# Patient Record
Sex: MALE | Race: WHITE | Employment: UNEMPLOYED | URBAN - METROPOLITAN AREA
[De-identification: names, ages, dates, MRNs, and addresses within clinical notes are randomized per-mention and may not be internally consistent; named-entity substitution may affect disease eponyms.]

---

## 2018-03-01 ENCOUNTER — OFFICE VISIT (OUTPATIENT)
Dept: CARDIOLOGY | Age: 53
End: 2018-03-01
Payer: MEDICARE

## 2018-03-01 ENCOUNTER — HOSPITAL ENCOUNTER (OUTPATIENT)
Age: 53
Discharge: HOME | End: 2018-03-01
Payer: MEDICARE

## 2018-03-01 VITALS
OXYGEN SATURATION: 96 % | HEIGHT: 68 IN | HEART RATE: 81 BPM | DIASTOLIC BLOOD PRESSURE: 91 MMHG | BODY MASS INDEX: 34.98 KG/M2 | SYSTOLIC BLOOD PRESSURE: 143 MMHG | WEIGHT: 230.8 LBS

## 2018-03-01 DIAGNOSIS — Z01.818 PREOP TESTING: ICD-10-CM

## 2018-03-01 DIAGNOSIS — I35.0 MODERATE TO SEVERE AORTIC STENOSIS: Primary | ICD-10-CM

## 2018-03-01 DIAGNOSIS — R06.00 DYSPNEA, UNSPECIFIED TYPE: ICD-10-CM

## 2018-03-01 DIAGNOSIS — R07.9 CHEST PAIN, UNSPECIFIED TYPE: ICD-10-CM

## 2018-03-01 LAB
ANION GAP SERPL CALCULATED.3IONS-SCNC: 13 MMOL/L (ref 9–17)
BUN BLDV-MCNC: 17 MG/DL (ref 6–20)
BUN/CREAT BLD: 20 (ref 9–20)
CALCIUM SERPL-MCNC: 9.2 MG/DL (ref 8.6–10.4)
CHLORIDE BLD-SCNC: 97 MMOL/L (ref 98–107)
CO2: 29 MMOL/L (ref 20–31)
CREAT SERPL-MCNC: 0.83 MG/DL (ref 0.7–1.2)
GFR AFRICAN AMERICAN: >60 ML/MIN
GFR NON-AFRICAN AMERICAN: >60 ML/MIN
GFR SERPL CREATININE-BSD FRML MDRD: ABNORMAL ML/MIN/{1.73_M2}
GFR SERPL CREATININE-BSD FRML MDRD: ABNORMAL ML/MIN/{1.73_M2}
GLUCOSE BLD-MCNC: 102 MG/DL (ref 70–99)
HCT VFR BLD CALC: 46.3 % (ref 40.7–50.3)
HEMOGLOBIN: 14.7 G/DL (ref 13–17)
MCH RBC QN AUTO: 30.4 PG (ref 25.2–33.5)
MCHC RBC AUTO-ENTMCNC: 31.7 G/DL (ref 28.4–34.8)
MCV RBC AUTO: 95.9 FL (ref 82.6–102.9)
NRBC AUTOMATED: 0 PER 100 WBC
PDW BLD-RTO: 13.9 % (ref 11.8–14.4)
PLATELET # BLD: 269 K/UL (ref 138–453)
PMV BLD AUTO: 11.2 FL (ref 8.1–13.5)
POTASSIUM SERPL-SCNC: 4.3 MMOL/L (ref 3.7–5.3)
RBC # BLD: 4.83 M/UL (ref 4.21–5.77)
SODIUM BLD-SCNC: 139 MMOL/L (ref 135–144)
WBC # BLD: 9.2 K/UL (ref 3.5–11.3)

## 2018-03-01 PROCEDURE — 80048 BASIC METABOLIC PNL TOTAL CA: CPT

## 2018-03-01 PROCEDURE — 99245 OFF/OP CONSLTJ NEW/EST HI 55: CPT | Performed by: FAMILY MEDICINE

## 2018-03-01 PROCEDURE — 85027 COMPLETE CBC AUTOMATED: CPT

## 2018-03-01 PROCEDURE — 93000 ELECTROCARDIOGRAM COMPLETE: CPT | Performed by: FAMILY MEDICINE

## 2018-03-01 PROCEDURE — 36415 COLL VENOUS BLD VENIPUNCTURE: CPT

## 2018-03-01 RX ORDER — NITROGLYCERIN 0.4 MG/1
0.4 TABLET SUBLINGUAL EVERY 5 MIN PRN
COMMUNITY
End: 2018-03-01

## 2018-03-01 RX ORDER — MONTELUKAST SODIUM 10 MG/1
10 TABLET ORAL DAILY
Refills: 0 | COMMUNITY
Start: 2018-01-29

## 2018-03-01 RX ORDER — TIZANIDINE 4 MG/1
4 TABLET ORAL 3 TIMES DAILY
Refills: 0 | COMMUNITY
Start: 2018-01-26

## 2018-03-01 RX ORDER — MELOXICAM 15 MG/1
15 TABLET ORAL DAILY
Refills: 0 | Status: ON HOLD | COMMUNITY
Start: 2018-01-26 | End: 2018-03-20 | Stop reason: HOSPADM

## 2018-03-01 RX ORDER — FUROSEMIDE 20 MG/1
20 TABLET ORAL DAILY
Refills: 0 | COMMUNITY
Start: 2018-02-12 | End: 2018-03-02 | Stop reason: SDUPTHER

## 2018-03-01 RX ORDER — ALBUTEROL SULFATE 2.5 MG/3ML
SOLUTION RESPIRATORY (INHALATION)
Refills: 0 | COMMUNITY
Start: 2017-12-06

## 2018-03-01 RX ORDER — BUDESONIDE AND FORMOTEROL FUMARATE DIHYDRATE 160; 4.5 UG/1; UG/1
AEROSOL RESPIRATORY (INHALATION)
Refills: 0 | COMMUNITY
Start: 2018-02-14

## 2018-03-01 RX ORDER — NITROGLYCERIN 0.4 MG/1
0.4 TABLET SUBLINGUAL EVERY 5 MIN PRN
Qty: 25 TABLET | Refills: 3 | Status: ON HOLD | OUTPATIENT
Start: 2018-03-01 | End: 2018-03-20 | Stop reason: HOSPADM

## 2018-03-01 RX ORDER — OMEPRAZOLE 20 MG/1
20 CAPSULE, DELAYED RELEASE ORAL DAILY
Refills: 0 | Status: ON HOLD | COMMUNITY
Start: 2018-01-29 | End: 2018-03-20 | Stop reason: HOSPADM

## 2018-03-01 RX ORDER — AMITRIPTYLINE HYDROCHLORIDE 50 MG/1
100 TABLET, FILM COATED ORAL DAILY
Refills: 0 | COMMUNITY
Start: 2018-01-26
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
## 2018-03-02 RX ORDER — FUROSEMIDE 20 MG/1
20 TABLET ORAL DAILY
Qty: 90 TABLET | Refills: 3 | Status: SHIPPED | OUTPATIENT
Start: 2018-03-02 | End: 2018-03-05 | Stop reason: ALTCHOICE

## 2018-03-05 ENCOUNTER — TELEPHONE (OUTPATIENT)
Dept: CARDIOLOGY | Age: 53
End: 2018-03-05

## 2018-03-05 RX ORDER — FUROSEMIDE 20 MG/1
20 TABLET ORAL DAILY
Qty: 90 TABLET | Refills: 3 | Status: ON HOLD | OUTPATIENT
Start: 2018-03-05 | End: 2018-03-20 | Stop reason: HOSPADM

## 2018-03-05 NOTE — TELEPHONE ENCOUNTER
Patient called upset that he has not been able to get his Lasix. For some reason the Rx was sent to pharmacy as regular LASIX JUANCARLOS which patient has never had and requires a prior auth so he has not been able to get. He reports he has always taken the generic form Furosemide. It looks like Dr Jesus Moss did approve for the Furosemide 20mg daily as in his office note.   Called pharmacy [facility] and called in Rx for the generic Furosemide for patient so he could  the medication to take as he is all out

## 2018-03-15 ENCOUNTER — APPOINTMENT (OUTPATIENT)
Dept: GENERAL RADIOLOGY | Age: 53
DRG: 160 | End: 2018-03-15
Attending: THORACIC SURGERY (CARDIOTHORACIC VASCULAR SURGERY)
Payer: MEDICARE

## 2018-03-15 ENCOUNTER — ANESTHESIA EVENT (OUTPATIENT)
Dept: OPERATING ROOM | Age: 53
DRG: 160 | End: 2018-03-15
Payer: MEDICARE

## 2018-03-15 ENCOUNTER — HOSPITAL ENCOUNTER (INPATIENT)
Age: 53
LOS: 5 days | Discharge: HOME HEALTH | DRG: 160 | End: 2018-03-20
Attending: THORACIC SURGERY (CARDIOTHORACIC VASCULAR SURGERY) | Admitting: THORACIC SURGERY (CARDIOTHORACIC VASCULAR SURGERY)
Payer: MEDICARE

## 2018-03-15 ENCOUNTER — HOSPITAL ENCOUNTER (OUTPATIENT)
Dept: NON INVASIVE DIAGNOSTICS | Age: 53
Discharge: HOME | End: 2018-03-15
Payer: MEDICARE

## 2018-03-15 ENCOUNTER — HOSPITAL ENCOUNTER (OUTPATIENT)
Dept: CARDIAC CATH/INVASIVE PROCEDURES | Age: 53
Discharge: HOME | End: 2018-03-15
Payer: MEDICARE

## 2018-03-15 VITALS
RESPIRATION RATE: 16 BRPM | SYSTOLIC BLOOD PRESSURE: 153 MMHG | TEMPERATURE: 98.6 F | BODY MASS INDEX: 34.86 KG/M2 | OXYGEN SATURATION: 96 % | HEART RATE: 73 BPM | WEIGHT: 230 LBS | HEIGHT: 68 IN | DIASTOLIC BLOOD PRESSURE: 78 MMHG

## 2018-03-15 DIAGNOSIS — R06.00 DYSPNEA, UNSPECIFIED TYPE: ICD-10-CM

## 2018-03-15 DIAGNOSIS — Z95.1 S/P CABG X 2: Primary | ICD-10-CM

## 2018-03-15 DIAGNOSIS — R07.9 CHEST PAIN, UNSPECIFIED TYPE: ICD-10-CM

## 2018-03-15 PROBLEM — I25.10 CAD, MULTIPLE VESSEL: Status: ACTIVE | Noted: 2018-03-15

## 2018-03-15 LAB
ABSOLUTE EOS #: 0.21 K/UL (ref 0–0.44)
ABSOLUTE IMMATURE GRANULOCYTE: 0.03 K/UL (ref 0–0.3)
ABSOLUTE LYMPH #: 2.84 K/UL (ref 1.1–3.7)
ABSOLUTE MONO #: 1.29 K/UL (ref 0.1–1.2)
BASOPHILS # BLD: 1 % (ref 0–2)
BASOPHILS ABSOLUTE: 0.07 K/UL (ref 0–0.2)
BILIRUBIN URINE: NEGATIVE
COLOR: YELLOW
COMMENT UA: NORMAL
CULTURE: NORMAL
DIFFERENTIAL TYPE: ABNORMAL
EOSINOPHILS RELATIVE PERCENT: 2 % (ref 1–4)
GLUCOSE BLD-MCNC: 174 MG/DL (ref 75–110)
GLUCOSE URINE: NEGATIVE
HCT VFR BLD CALC: 42 % (ref 40.7–50.3)
HEMOGLOBIN: 13.4 G/DL (ref 13–17)
IMMATURE GRANULOCYTES: 0 %
KETONES, URINE: NEGATIVE
LEUKOCYTE ESTERASE, URINE: NEGATIVE
LV EF: 65 %
LVEF MODALITY: NORMAL
LYMPHOCYTES # BLD: 26 % (ref 24–43)
Lab: NORMAL
MCH RBC QN AUTO: 30.3 PG (ref 25.2–33.5)
MCHC RBC AUTO-ENTMCNC: 31.9 G/DL (ref 28.4–34.8)
MCV RBC AUTO: 95 FL (ref 82.6–102.9)
MONOCYTES # BLD: 12 % (ref 3–12)
MRSA, DNA, NASAL: NORMAL
NITRITE, URINE: NEGATIVE
NRBC AUTOMATED: 0 PER 100 WBC
PARTIAL THROMBOPLASTIN TIME: 31.4 SEC (ref 20.5–30.5)
PDW BLD-RTO: 13.6 % (ref 11.8–14.4)
PH UA: 7 (ref 5–8)
PLATELET # BLD: 257 K/UL (ref 138–453)
PLATELET ESTIMATE: ABNORMAL
PMV BLD AUTO: 11.1 FL (ref 8.1–13.5)
PROTEIN UA: NEGATIVE
RBC # BLD: 4.42 M/UL (ref 4.21–5.77)
RBC # BLD: ABNORMAL 10*6/UL
SEG NEUTROPHILS: 59 % (ref 36–65)
SEGMENTED NEUTROPHILS ABSOLUTE COUNT: 6.64 K/UL (ref 1.5–8.1)
SPECIFIC GRAVITY UA: 1.01 (ref 1–1.03)
SPECIMEN DESCRIPTION: NORMAL
SPECIMEN DESCRIPTION: NORMAL
STATUS: NORMAL
TURBIDITY: CLEAR
URINE HGB: NEGATIVE
UROBILINOGEN, URINE: NORMAL
WBC # BLD: 11.1 K/UL (ref 3.5–11.3)
WBC # BLD: ABNORMAL 10*3/UL

## 2018-03-15 PROCEDURE — 2580000003 HC RX 258: Performed by: THORACIC SURGERY (CARDIOTHORACIC VASCULAR SURGERY)

## 2018-03-15 PROCEDURE — 85730 THROMBOPLASTIN TIME PARTIAL: CPT

## 2018-03-15 PROCEDURE — 93005 ELECTROCARDIOGRAM TRACING: CPT

## 2018-03-15 PROCEDURE — 2060000000 HC ICU INTERMEDIATE R&B

## 2018-03-15 PROCEDURE — 71045 X-RAY EXAM CHEST 1 VIEW: CPT

## 2018-03-15 PROCEDURE — 86920 COMPATIBILITY TEST SPIN: CPT

## 2018-03-15 PROCEDURE — 93880 EXTRACRANIAL BILAT STUDY: CPT

## 2018-03-15 PROCEDURE — 87081 CULTURE SCREEN ONLY: CPT

## 2018-03-15 PROCEDURE — C1725 CATH, TRANSLUMIN NON-LASER: HCPCS

## 2018-03-15 PROCEDURE — 93312 ECHO TRANSESOPHAGEAL: CPT | Performed by: FAMILY MEDICINE

## 2018-03-15 PROCEDURE — C1894 INTRO/SHEATH, NON-LASER: HCPCS

## 2018-03-15 PROCEDURE — 2500000003 HC RX 250 WO HCPCS

## 2018-03-15 PROCEDURE — 85025 COMPLETE CBC W/AUTO DIFF WBC: CPT

## 2018-03-15 PROCEDURE — 86901 BLOOD TYPING SEROLOGIC RH(D): CPT

## 2018-03-15 PROCEDURE — 86850 RBC ANTIBODY SCREEN: CPT

## 2018-03-15 PROCEDURE — C1887 CATHETER, GUIDING: HCPCS

## 2018-03-15 PROCEDURE — 94729 DIFFUSING CAPACITY: CPT

## 2018-03-15 PROCEDURE — 6360000002 HC RX W HCPCS

## 2018-03-15 PROCEDURE — 94640 AIRWAY INHALATION TREATMENT: CPT

## 2018-03-15 PROCEDURE — 81003 URINALYSIS AUTO W/O SCOPE: CPT

## 2018-03-15 PROCEDURE — 2580000003 HC RX 258: Performed by: FAMILY MEDICINE

## 2018-03-15 PROCEDURE — 93325 DOPPLER ECHO COLOR FLOW MAPG: CPT | Performed by: FAMILY MEDICINE

## 2018-03-15 PROCEDURE — 93312 ECHO TRANSESOPHAGEAL: CPT

## 2018-03-15 PROCEDURE — 36415 COLL VENOUS BLD VENIPUNCTURE: CPT

## 2018-03-15 PROCEDURE — 87641 MR-STAPH DNA AMP PROBE: CPT

## 2018-03-15 PROCEDURE — 93458 L HRT ARTERY/VENTRICLE ANGIO: CPT | Performed by: FAMILY MEDICINE

## 2018-03-15 PROCEDURE — 6360000002 HC RX W HCPCS: Performed by: FAMILY MEDICINE

## 2018-03-15 PROCEDURE — 86900 BLOOD TYPING SEROLOGIC ABO: CPT

## 2018-03-15 PROCEDURE — 82947 ASSAY GLUCOSE BLOOD QUANT: CPT

## 2018-03-15 PROCEDURE — 6370000000 HC RX 637 (ALT 250 FOR IP): Performed by: FAMILY MEDICINE

## 2018-03-15 PROCEDURE — 94726 PLETHYSMOGRAPHY LUNG VOLUMES: CPT

## 2018-03-15 PROCEDURE — 6370000000 HC RX 637 (ALT 250 FOR IP): Performed by: THORACIC SURGERY (CARDIOTHORACIC VASCULAR SURGERY)

## 2018-03-15 PROCEDURE — 94060 EVALUATION OF WHEEZING: CPT

## 2018-03-15 PROCEDURE — 94664 DEMO&/EVAL PT USE INHALER: CPT

## 2018-03-15 PROCEDURE — 6370000000 HC RX 637 (ALT 250 FOR IP)

## 2018-03-15 PROCEDURE — C1769 GUIDE WIRE: HCPCS

## 2018-03-15 RX ORDER — ONDANSETRON 2 MG/ML
4 INJECTION INTRAMUSCULAR; INTRAVENOUS EVERY 6 HOURS PRN
Status: DISCONTINUED | OUTPATIENT
Start: 2018-03-15 | End: 2018-03-16

## 2018-03-15 RX ORDER — ASPIRIN 81 MG/1
81 TABLET ORAL DAILY
Status: DISCONTINUED | OUTPATIENT
Start: 2018-03-15 | End: 2018-03-16

## 2018-03-15 RX ORDER — MIDAZOLAM HYDROCHLORIDE 1 MG/ML
INJECTION INTRAMUSCULAR; INTRAVENOUS
Status: COMPLETED | OUTPATIENT
Start: 2018-03-15 | End: 2018-03-15

## 2018-03-15 RX ORDER — SODIUM CHLORIDE 9 MG/ML
INJECTION, SOLUTION INTRAVENOUS CONTINUOUS
Status: DISCONTINUED | OUTPATIENT
Start: 2018-03-15 | End: 2018-03-16

## 2018-03-15 RX ORDER — AMIODARONE HYDROCHLORIDE 200 MG/1
200 TABLET ORAL 3 TIMES DAILY
Status: DISCONTINUED | OUTPATIENT
Start: 2018-03-15 | End: 2018-03-16

## 2018-03-15 RX ORDER — SODIUM CHLORIDE 0.9 % (FLUSH) 0.9 %
10 SYRINGE (ML) INJECTION EVERY 12 HOURS SCHEDULED
Status: DISCONTINUED | OUTPATIENT
Start: 2018-03-15 | End: 2018-03-15

## 2018-03-15 RX ORDER — DIPHENHYDRAMINE HCL 50 MG
50 CAPSULE ORAL ONCE
Status: DISCONTINUED | OUTPATIENT
Start: 2018-03-15 | End: 2018-03-16 | Stop reason: HOSPADM

## 2018-03-15 RX ORDER — ACETAMINOPHEN 325 MG/1
650 TABLET ORAL EVERY 4 HOURS PRN
Status: DISCONTINUED | OUTPATIENT
Start: 2018-03-15 | End: 2018-03-16 | Stop reason: HOSPADM

## 2018-03-15 RX ORDER — SODIUM CHLORIDE 0.9 % (FLUSH) 0.9 %
10 SYRINGE (ML) INJECTION EVERY 12 HOURS SCHEDULED
Status: DISCONTINUED | OUTPATIENT
Start: 2018-03-15 | End: 2018-03-16 | Stop reason: HOSPADM

## 2018-03-15 RX ORDER — FENTANYL CITRATE 50 UG/ML
INJECTION, SOLUTION INTRAMUSCULAR; INTRAVENOUS
Status: COMPLETED | OUTPATIENT
Start: 2018-03-15 | End: 2018-03-15

## 2018-03-15 RX ORDER — ZOLPIDEM TARTRATE 5 MG/1
5 TABLET ORAL NIGHTLY PRN
Status: DISCONTINUED | OUTPATIENT
Start: 2018-03-15 | End: 2018-03-16

## 2018-03-15 RX ORDER — SODIUM CHLORIDE 0.9 % (FLUSH) 0.9 %
10 SYRINGE (ML) INJECTION PRN
Status: DISCONTINUED | OUTPATIENT
Start: 2018-03-15 | End: 2018-03-16

## 2018-03-15 RX ORDER — NITROGLYCERIN 0.4 MG/1
0.4 TABLET SUBLINGUAL EVERY 5 MIN PRN
Status: DISCONTINUED | OUTPATIENT
Start: 2018-03-15 | End: 2018-03-16 | Stop reason: HOSPADM

## 2018-03-15 RX ORDER — SODIUM CHLORIDE 0.9 % (FLUSH) 0.9 %
10 SYRINGE (ML) INJECTION PRN
Status: DISCONTINUED | OUTPATIENT
Start: 2018-03-15 | End: 2018-03-15

## 2018-03-15 RX ORDER — CHLORHEXIDINE GLUCONATE 0.12 MG/ML
15 RINSE ORAL ONCE
Status: COMPLETED | OUTPATIENT
Start: 2018-03-15 | End: 2018-03-16

## 2018-03-15 RX ORDER — SODIUM CHLORIDE 0.9 % (FLUSH) 0.9 %
10 SYRINGE (ML) INJECTION PRN
Status: DISCONTINUED | OUTPATIENT
Start: 2018-03-15 | End: 2018-03-16 | Stop reason: HOSPADM

## 2018-03-15 RX ORDER — ALPRAZOLAM 0.5 MG/1
0.5 TABLET ORAL 3 TIMES DAILY PRN
Status: DISCONTINUED | OUTPATIENT
Start: 2018-03-15 | End: 2018-03-16

## 2018-03-15 RX ORDER — ACETAMINOPHEN 325 MG/1
650 TABLET ORAL EVERY 4 HOURS PRN
Status: DISCONTINUED | OUTPATIENT
Start: 2018-03-15 | End: 2018-03-15

## 2018-03-15 RX ORDER — SODIUM CHLORIDE 9 MG/ML
INJECTION, SOLUTION INTRAVENOUS CONTINUOUS
Status: DISCONTINUED | OUTPATIENT
Start: 2018-03-15 | End: 2018-03-16 | Stop reason: HOSPADM

## 2018-03-15 RX ORDER — SODIUM CHLORIDE 0.9 % (FLUSH) 0.9 %
10 SYRINGE (ML) INJECTION EVERY 12 HOURS SCHEDULED
Status: DISCONTINUED | OUTPATIENT
Start: 2018-03-15 | End: 2018-03-16

## 2018-03-15 RX ADMIN — MIDAZOLAM HYDROCHLORIDE 1 MG: 2 INJECTION, SOLUTION INTRAMUSCULAR; INTRAVENOUS at 09:38

## 2018-03-15 RX ADMIN — ASPIRIN 81 MG: 81 TABLET, COATED ORAL at 18:01

## 2018-03-15 RX ADMIN — MIDAZOLAM HYDROCHLORIDE 1 MG: 2 INJECTION, SOLUTION INTRAMUSCULAR; INTRAVENOUS at 09:35

## 2018-03-15 RX ADMIN — BENZOCAINE 1 EACH: 200 SPRAY DENTAL; ORAL; PERIODONTAL at 09:21

## 2018-03-15 RX ADMIN — MIDAZOLAM HYDROCHLORIDE 1 MG: 2 INJECTION, SOLUTION INTRAMUSCULAR; INTRAVENOUS at 09:28

## 2018-03-15 RX ADMIN — MIDAZOLAM HYDROCHLORIDE 2 MG: 2 INJECTION, SOLUTION INTRAMUSCULAR; INTRAVENOUS at 09:25

## 2018-03-15 RX ADMIN — BENZOCAINE 1 EACH: 200 SPRAY DENTAL; ORAL; PERIODONTAL at 09:22

## 2018-03-15 RX ADMIN — AMIODARONE HYDROCHLORIDE 200 MG: 200 TABLET ORAL at 18:00

## 2018-03-15 RX ADMIN — MIDAZOLAM HYDROCHLORIDE 1 MG: 2 INJECTION, SOLUTION INTRAMUSCULAR; INTRAVENOUS at 09:32

## 2018-03-15 RX ADMIN — SODIUM CHLORIDE: 9 INJECTION, SOLUTION INTRAVENOUS at 08:40

## 2018-03-15 RX ADMIN — MIDAZOLAM HYDROCHLORIDE 2 MG: 2 INJECTION, SOLUTION INTRAMUSCULAR; INTRAVENOUS at 09:28

## 2018-03-15 RX ADMIN — MIDAZOLAM HYDROCHLORIDE 1 MG: 2 INJECTION, SOLUTION INTRAMUSCULAR; INTRAVENOUS at 09:24

## 2018-03-15 RX ADMIN — Medication 10 ML: at 19:46

## 2018-03-15 RX ADMIN — AMIODARONE HYDROCHLORIDE 200 MG: 200 TABLET ORAL at 19:46

## 2018-03-15 RX ADMIN — METOPROLOL TARTRATE 25 MG: 25 TABLET ORAL at 18:04

## 2018-03-15 RX ADMIN — FENTANYL CITRATE 25 MCG: 50 INJECTION, SOLUTION INTRAMUSCULAR; INTRAVENOUS at 09:24

## 2018-03-15 RX ADMIN — MIDAZOLAM HYDROCHLORIDE 1 MG: 2 INJECTION, SOLUTION INTRAMUSCULAR; INTRAVENOUS at 09:31

## 2018-03-15 RX ADMIN — SODIUM CHLORIDE: 9 INJECTION, SOLUTION INTRAVENOUS at 18:00

## 2018-03-15 ASSESSMENT — PAIN DESCRIPTION - PROGRESSION: CLINICAL_PROGRESSION: NOT CHANGED

## 2018-03-15 ASSESSMENT — PAIN SCALES - GENERAL: PAINLEVEL_OUTOF10: 8

## 2018-03-15 ASSESSMENT — PAIN DESCRIPTION - FREQUENCY: FREQUENCY: CONTINUOUS

## 2018-03-15 ASSESSMENT — PAIN DESCRIPTION - ONSET: ONSET: ON-GOING

## 2018-03-15 ASSESSMENT — PAIN DESCRIPTION - PAIN TYPE: TYPE: ACUTE PAIN

## 2018-03-15 ASSESSMENT — PAIN DESCRIPTION - ORIENTATION: ORIENTATION: LEFT

## 2018-03-15 ASSESSMENT — PAIN DESCRIPTION - LOCATION: LOCATION: ARM

## 2018-03-15 ASSESSMENT — PAIN DESCRIPTION - DESCRIPTORS: DESCRIPTORS: SHARP

## 2018-03-15 NOTE — PROGRESS NOTES
Voids per urinal.  Transferred from cart to chair. Respirations easy. TR band remains in place. Vital signs stable.

## 2018-03-16 ENCOUNTER — ANESTHESIA (OUTPATIENT)
Dept: OPERATING ROOM | Age: 53
DRG: 160 | End: 2018-03-16
Payer: MEDICARE

## 2018-03-16 ENCOUNTER — APPOINTMENT (OUTPATIENT)
Dept: CT IMAGING | Age: 53
DRG: 160 | End: 2018-03-16
Attending: THORACIC SURGERY (CARDIOTHORACIC VASCULAR SURGERY)
Payer: MEDICARE

## 2018-03-16 ENCOUNTER — APPOINTMENT (OUTPATIENT)
Dept: GENERAL RADIOLOGY | Age: 53
DRG: 160 | End: 2018-03-16
Attending: THORACIC SURGERY (CARDIOTHORACIC VASCULAR SURGERY)
Payer: MEDICARE

## 2018-03-16 VITALS
OXYGEN SATURATION: 100 % | RESPIRATION RATE: 16 BRPM | SYSTOLIC BLOOD PRESSURE: 158 MMHG | TEMPERATURE: 98.2 F | DIASTOLIC BLOOD PRESSURE: 82 MMHG

## 2018-03-16 LAB
ABSOLUTE EOS #: 0.38 K/UL (ref 0–0.44)
ABSOLUTE IMMATURE GRANULOCYTE: 0.03 K/UL (ref 0–0.3)
ABSOLUTE LYMPH #: 2.28 K/UL (ref 1.1–3.7)
ABSOLUTE MONO #: 1.27 K/UL (ref 0.1–1.2)
ALLEN TEST: ABNORMAL
ANION GAP SERPL CALCULATED.3IONS-SCNC: 11 MMOL/L (ref 9–17)
ANION GAP SERPL CALCULATED.3IONS-SCNC: 13 MMOL/L (ref 9–17)
ANION GAP: 12 MMOL/L (ref 7–16)
ANION GAP: 9 MMOL/L (ref 7–16)
BASOPHILS # BLD: 1 % (ref 0–2)
BASOPHILS ABSOLUTE: 0.07 K/UL (ref 0–0.2)
BILIRUBIN URINE: NEGATIVE
BUN BLDV-MCNC: 11 MG/DL (ref 6–20)
BUN BLDV-MCNC: 12 MG/DL (ref 6–20)
BUN/CREAT BLD: ABNORMAL (ref 9–20)
BUN/CREAT BLD: ABNORMAL (ref 9–20)
CALCIUM SERPL-MCNC: 7 MG/DL (ref 8.6–10.4)
CALCIUM SERPL-MCNC: 8.9 MG/DL (ref 8.6–10.4)
CHLORIDE BLD-SCNC: 103 MMOL/L (ref 98–107)
CHLORIDE BLD-SCNC: 107 MMOL/L (ref 98–107)
CO2: 22 MMOL/L (ref 20–31)
CO2: 24 MMOL/L (ref 20–31)
COLOR: YELLOW
COMMENT UA: NORMAL
CREAT SERPL-MCNC: 0.57 MG/DL (ref 0.7–1.2)
CREAT SERPL-MCNC: 0.58 MG/DL (ref 0.7–1.2)
DIFFERENTIAL TYPE: ABNORMAL
EKG ATRIAL RATE: 76 BPM
EKG P AXIS: 50 DEGREES
EKG P-R INTERVAL: 170 MS
EKG Q-T INTERVAL: 462 MS
EKG QRS DURATION: 160 MS
EKG QTC CALCULATION (BAZETT): 519 MS
EKG R AXIS: -63 DEGREES
EKG T AXIS: 77 DEGREES
EKG VENTRICULAR RATE: 76 BPM
EOSINOPHILS RELATIVE PERCENT: 4 % (ref 1–4)
FIBRINOGEN: 246 MG/DL (ref 140–420)
FIO2: 60
FIO2: 60
FIO2: ABNORMAL
GFR AFRICAN AMERICAN: >60 ML/MIN
GFR AFRICAN AMERICAN: >60 ML/MIN
GFR NON-AFRICAN AMERICAN: >60 ML/MIN
GFR SERPL CREATININE-BSD FRML MDRD: >60 ML/MIN
GFR SERPL CREATININE-BSD FRML MDRD: ABNORMAL ML/MIN/{1.73_M2}
GFR SERPL CREATININE-BSD FRML MDRD: NORMAL ML/MIN/{1.73_M2}
GLUCOSE BLD-MCNC: 100 MG/DL (ref 70–99)
GLUCOSE BLD-MCNC: 131 MG/DL (ref 70–99)
GLUCOSE BLD-MCNC: 134 MG/DL (ref 74–100)
GLUCOSE BLD-MCNC: 142 MG/DL (ref 74–100)
GLUCOSE BLD-MCNC: 160 MG/DL (ref 74–100)
GLUCOSE BLD-MCNC: 161 MG/DL (ref 74–100)
GLUCOSE BLD-MCNC: 163 MG/DL (ref 74–100)
GLUCOSE BLD-MCNC: 165 MG/DL (ref 75–110)
GLUCOSE BLD-MCNC: 172 MG/DL (ref 74–100)
GLUCOSE BLD-MCNC: 185 MG/DL (ref 74–100)
GLUCOSE BLD-MCNC: 196 MG/DL (ref 74–100)
GLUCOSE URINE: NEGATIVE
HCO3 VENOUS: 25 MMOL/L (ref 22–29)
HCT VFR BLD CALC: 27.6 % (ref 40.7–50.3)
HCT VFR BLD CALC: 34.4 % (ref 40.7–50.3)
HCT VFR BLD CALC: 40.1 % (ref 40.7–50.3)
HEMOGLOBIN: 10.8 G/DL (ref 13–17)
HEMOGLOBIN: 12.4 G/DL (ref 13–17)
HEMOGLOBIN: 8.7 G/DL (ref 13–17)
IMMATURE GRANULOCYTES: 0 %
INR BLD: 0.9
INR BLD: 1.2
KETONES, URINE: NEGATIVE
LEUKOCYTE ESTERASE, URINE: NEGATIVE
LYMPHOCYTES # BLD: 22 % (ref 24–43)
MAGNESIUM: 2.9 MG/DL (ref 1.6–2.6)
MCH RBC QN AUTO: 30.4 PG (ref 25.2–33.5)
MCH RBC QN AUTO: 30.9 PG (ref 25.2–33.5)
MCHC RBC AUTO-ENTMCNC: 30.9 G/DL (ref 28.4–34.8)
MCHC RBC AUTO-ENTMCNC: 31.4 G/DL (ref 28.4–34.8)
MCV RBC AUTO: 98.3 FL (ref 82.6–102.9)
MCV RBC AUTO: 98.3 FL (ref 82.6–102.9)
MODE: ABNORMAL
MONOCYTES # BLD: 12 % (ref 3–12)
NEGATIVE BASE EXCESS, ART: 1 (ref 0–2)
NEGATIVE BASE EXCESS, ART: 2 (ref 0–2)
NEGATIVE BASE EXCESS, ART: ABNORMAL (ref 0–2)
NEGATIVE BASE EXCESS, VEN: 1 (ref 0–2)
NITRITE, URINE: NEGATIVE
NRBC AUTOMATED: 0 PER 100 WBC
NRBC AUTOMATED: 0 PER 100 WBC
O2 DEVICE/FLOW/%: ABNORMAL
O2 SAT, VEN: 100 % (ref 60–85)
PARTIAL THROMBOPLASTIN TIME: 28 SEC (ref 20.5–30.5)
PARTIAL THROMBOPLASTIN TIME: 29.1 SEC (ref 20.5–30.5)
PATIENT TEMP: 38
PATIENT TEMP: ABNORMAL
PCO2, VEN: 49.6 MM HG (ref 41–51)
PDW BLD-RTO: 13.5 % (ref 11.8–14.4)
PDW BLD-RTO: 13.6 % (ref 11.8–14.4)
PH UA: 6.5 (ref 5–8)
PH VENOUS: 7.31 (ref 7.32–7.43)
PLATELET # BLD: 107 K/UL (ref 138–453)
PLATELET # BLD: 147 K/UL (ref 138–453)
PLATELET # BLD: 235 K/UL (ref 138–453)
PLATELET ESTIMATE: ABNORMAL
PMV BLD AUTO: 11.1 FL (ref 8.1–13.5)
PMV BLD AUTO: 11.4 FL (ref 8.1–13.5)
PO2, VEN: 241 MM HG (ref 30–50)
POC ANGLE TEG W HEP: 68.5 DEG (ref 59–74)
POC ANGLE TEG W HEP: 69.9 DEG (ref 59–74)
POC ANGLE TEG: 68.4 DEG (ref 59–74)
POC ANGLE TEG: 71.1 DEG (ref 59–74)
POC CHLORIDE: 108 MMOL/L (ref 98–107)
POC CHLORIDE: 109 MMOL/L (ref 98–107)
POC CREATININE: 0.55 MG/DL (ref 0.51–1.19)
POC EPL TEG W/HEP: 2 % (ref 0–15)
POC EPL TEG W/HEP: NORMAL % (ref 0–15)
POC EPL TEG: 0.4 % (ref 0–15)
POC EPL TEG: NORMAL % (ref 0–15)
POC HCO3: 21.6 MMOL/L (ref 21–28)
POC HCO3: 25 MMOL/L (ref 21–28)
POC HCO3: 26 MMOL/L (ref 21–28)
POC HCO3: 26.5 MMOL/L (ref 21–28)
POC HCO3: 27.3 MMOL/L (ref 21–28)
POC HCO3: 27.7 MMOL/L (ref 21–28)
POC HCO3: 27.8 MMOL/L (ref 21–28)
POC HEMATOCRIT: 25 % (ref 41–53)
POC HEMATOCRIT: 26 % (ref 41–53)
POC HEMATOCRIT: 28 % (ref 41–53)
POC HEMATOCRIT: 30 % (ref 41–53)
POC HEMATOCRIT: 31 % (ref 41–53)
POC HEMATOCRIT: 33 % (ref 41–53)
POC HEMATOCRIT: 35 % (ref 41–53)
POC HEMATOCRIT: 37 % (ref 41–53)
POC HEMOGLOBIN: 10.3 G/DL (ref 13.5–17.5)
POC HEMOGLOBIN: 10.5 G/DL (ref 13.5–17.5)
POC HEMOGLOBIN: 11.2 G/DL (ref 13.5–17.5)
POC HEMOGLOBIN: 11.9 G/DL (ref 13.5–17.5)
POC HEMOGLOBIN: 12.6 G/DL (ref 13.5–17.5)
POC HEMOGLOBIN: 8.6 G/DL (ref 13.5–17.5)
POC HEMOGLOBIN: 8.9 G/DL (ref 13.5–17.5)
POC HEMOGLOBIN: 9.6 G/DL (ref 13.5–17.5)
POC IONIZED CALCIUM: 0.97 MMOL/L (ref 1.15–1.33)
POC IONIZED CALCIUM: 1.07 MMOL/L (ref 1.15–1.33)
POC IONIZED CALCIUM: 1.09 MMOL/L (ref 1.15–1.33)
POC IONIZED CALCIUM: 1.1 MMOL/L (ref 1.15–1.33)
POC IONIZED CALCIUM: 1.12 MMOL/L (ref 1.15–1.33)
POC IONIZED CALCIUM: 1.13 MMOL/L (ref 1.15–1.33)
POC KINETICS TEG W HEP: 1.3 MIN (ref 1–3)
POC KINETICS TEG W HEP: 1.5 MIN (ref 1–3)
POC KINETICS TEG: 1.4 MIN (ref 1–3)
POC KINETICS TEG: 1.5 MIN (ref 1–3)
POC LACTIC ACID: 2.72 MMOL/L (ref 0.56–1.39)
POC LY30(LYSIS) TEG W HEP: 2 % (ref 0–8)
POC LY30(LYSIS) TEG W HEP: NORMAL % (ref 0–8)
POC LY30(LYSIS) TEG: 0.4 % (ref 0–8)
POC LY30(LYSIS) TEG: NORMAL % (ref 0–8)
POC MA(MAX CLOT) TEG: 68.9 MM (ref 55–74)
POC MA(MAX CLOT) TEG: 75.1 MM (ref 55–74)
POC MAX CLOT TEG W HEP: 67.8 MM (ref 55–74)
POC MAX CLOT TEG W HEP: 74.2 MM (ref 55–74)
POC O2 SATURATION: 100 % (ref 94–98)
POC O2 SATURATION: 100 % (ref 94–98)
POC O2 SATURATION: 89 % (ref 94–98)
POC O2 SATURATION: 94 % (ref 94–98)
POC O2 SATURATION: 99 % (ref 94–98)
POC PCO2 TEMP: 34 MM HG
POC PCO2 TEMP: ABNORMAL MM HG
POC PCO2: 32.9 MM HG (ref 35–48)
POC PCO2: 40.1 MM HG (ref 35–48)
POC PCO2: 46.2 MM HG (ref 35–48)
POC PCO2: 46.5 MM HG (ref 35–48)
POC PCO2: 46.9 MM HG (ref 35–48)
POC PCO2: 53.2 MM HG (ref 35–48)
POC PCO2: 54 MM HG (ref 35–48)
POC PH TEMP: 7.41
POC PH TEMP: ABNORMAL
POC PH: 7.29 (ref 7.35–7.45)
POC PH: 7.33 (ref 7.35–7.45)
POC PH: 7.37 (ref 7.35–7.45)
POC PH: 7.38 (ref 7.35–7.45)
POC PH: 7.38 (ref 7.35–7.45)
POC PH: 7.4 (ref 7.35–7.45)
POC PH: 7.43 (ref 7.35–7.45)
POC PO2 TEMP: 74 MM HG
POC PO2 TEMP: ABNORMAL MM HG
POC PO2: 145.8 MM HG (ref 83–108)
POC PO2: 148.6 MM HG (ref 83–108)
POC PO2: 154.1 MM HG (ref 83–108)
POC PO2: 189.1 MM HG (ref 83–108)
POC PO2: 348.2 MM HG (ref 83–108)
POC PO2: 63.9 MM HG (ref 83–108)
POC PO2: 68.9 MM HG (ref 83–108)
POC POTASSIUM: 3.8 MMOL/L (ref 3.5–4.5)
POC POTASSIUM: 3.8 MMOL/L (ref 3.5–4.5)
POC POTASSIUM: 4 MMOL/L (ref 3.5–4.5)
POC POTASSIUM: 4.4 MMOL/L (ref 3.5–4.5)
POC POTASSIUM: 4.7 MMOL/L (ref 3.5–4.5)
POC POTASSIUM: 4.7 MMOL/L (ref 3.5–4.5)
POC POTASSIUM: 5 MMOL/L (ref 3.5–4.5)
POC POTASSIUM: 5.1 MMOL/L (ref 3.5–4.5)
POC REACTION TIME TEG W HEP: 7.5 MIN (ref 4–9)
POC REACTION TIME TEG W HEP: 7.8 MIN (ref 4–9)
POC REACTION TIME TEG: 7.6 MIN (ref 4–9)
POC REACTION TIME TEG: 8.2 MIN (ref 4–9)
POC SODIUM: 142 MMOL/L (ref 138–146)
POC SODIUM: 143 MMOL/L (ref 138–146)
POC SODIUM: 143 MMOL/L (ref 138–146)
POSITIVE BASE EXCESS, ART: 0 (ref 0–3)
POSITIVE BASE EXCESS, ART: 1 (ref 0–3)
POSITIVE BASE EXCESS, ART: 1 (ref 0–3)
POSITIVE BASE EXCESS, ART: 2 (ref 0–3)
POSITIVE BASE EXCESS, ART: 2 (ref 0–3)
POSITIVE BASE EXCESS, ART: ABNORMAL (ref 0–3)
POSITIVE BASE EXCESS, ART: ABNORMAL (ref 0–3)
POSITIVE BASE EXCESS, VEN: ABNORMAL (ref 0–3)
POTASSIUM SERPL-SCNC: 4 MMOL/L (ref 3.7–5.3)
POTASSIUM SERPL-SCNC: 4 MMOL/L (ref 3.7–5.3)
POTASSIUM SERPL-SCNC: 5 MMOL/L (ref 3.7–5.3)
PROTEIN UA: NEGATIVE
PROTHROMBIN TIME: 12.5 SEC (ref 9–12)
PROTHROMBIN TIME: 9.7 SEC (ref 9–12)
RBC # BLD: 3.5 M/UL (ref 4.21–5.77)
RBC # BLD: 4.08 M/UL (ref 4.21–5.77)
RBC # BLD: ABNORMAL 10*6/UL
SAMPLE SITE: ABNORMAL
SEG NEUTROPHILS: 61 % (ref 36–65)
SEGMENTED NEUTROPHILS ABSOLUTE COUNT: 6.31 K/UL (ref 1.5–8.1)
SODIUM BLD-SCNC: 140 MMOL/L (ref 135–144)
SODIUM BLD-SCNC: 140 MMOL/L (ref 135–144)
SPECIFIC GRAVITY UA: 1.01 (ref 1–1.03)
TCO2 (CALC), ART: 23 MMOL/L (ref 22–29)
TCO2 (CALC), ART: 26 MMOL/L (ref 22–29)
TCO2 (CALC), ART: 28 MMOL/L (ref 22–29)
TCO2 (CALC), ART: 28 MMOL/L (ref 22–29)
TCO2 (CALC), ART: 29 MMOL/L (ref 22–29)
TEG COMMENT: ABNORMAL
TEG COMMENT: ABNORMAL
TEG COMMENT: NORMAL
TEG COMMENT: NORMAL
TOTAL CO2, VENOUS: 27 MMOL/L (ref 23–30)
TURBIDITY: CLEAR
URINE HGB: NEGATIVE
UROBILINOGEN, URINE: NORMAL
WBC # BLD: 10.3 K/UL (ref 3.5–11.3)
WBC # BLD: 25.1 K/UL (ref 3.5–11.3)
WBC # BLD: ABNORMAL 10*3/UL

## 2018-03-16 PROCEDURE — 3700000001 HC ADD 15 MINUTES (ANESTHESIA): Performed by: THORACIC SURGERY (CARDIOTHORACIC VASCULAR SURGERY)

## 2018-03-16 PROCEDURE — 81003 URINALYSIS AUTO W/O SCOPE: CPT

## 2018-03-16 PROCEDURE — 2720000010 HC SURG SUPPLY STERILE: Performed by: THORACIC SURGERY (CARDIOTHORACIC VASCULAR SURGERY)

## 2018-03-16 PROCEDURE — 94770 HC ETCO2 MONITOR DAILY: CPT

## 2018-03-16 PROCEDURE — 85014 HEMATOCRIT: CPT

## 2018-03-16 PROCEDURE — 2500000003 HC RX 250 WO HCPCS: Performed by: NURSE ANESTHETIST, CERTIFIED REGISTERED

## 2018-03-16 PROCEDURE — 6360000002 HC RX W HCPCS: Performed by: NURSE ANESTHETIST, CERTIFIED REGISTERED

## 2018-03-16 PROCEDURE — 6360000002 HC RX W HCPCS: Performed by: THORACIC SURGERY (CARDIOTHORACIC VASCULAR SURGERY)

## 2018-03-16 PROCEDURE — 3600000018 HC SURGERY OHS ADDTL 15MIN: Performed by: THORACIC SURGERY (CARDIOTHORACIC VASCULAR SURGERY)

## 2018-03-16 PROCEDURE — 82565 ASSAY OF CREATININE: CPT

## 2018-03-16 PROCEDURE — A4648 IMPLANTABLE TISSUE MARKER: HCPCS | Performed by: THORACIC SURGERY (CARDIOTHORACIC VASCULAR SURGERY)

## 2018-03-16 PROCEDURE — 82947 ASSAY GLUCOSE BLOOD QUANT: CPT

## 2018-03-16 PROCEDURE — 82803 BLOOD GASES ANY COMBINATION: CPT

## 2018-03-16 PROCEDURE — 87086 URINE CULTURE/COLONY COUNT: CPT

## 2018-03-16 PROCEDURE — 82435 ASSAY OF BLOOD CHLORIDE: CPT

## 2018-03-16 PROCEDURE — 6370000000 HC RX 637 (ALT 250 FOR IP): Performed by: THORACIC SURGERY (CARDIOTHORACIC VASCULAR SURGERY)

## 2018-03-16 PROCEDURE — 6370000000 HC RX 637 (ALT 250 FOR IP): Performed by: NURSE ANESTHETIST, CERTIFIED REGISTERED

## 2018-03-16 PROCEDURE — 85347 COAGULATION TIME ACTIVATED: CPT

## 2018-03-16 PROCEDURE — 6360000002 HC RX W HCPCS

## 2018-03-16 PROCEDURE — 6370000000 HC RX 637 (ALT 250 FOR IP): Performed by: PHYSICIAN ASSISTANT

## 2018-03-16 PROCEDURE — 02110Z9 BYPASS CORONARY ARTERY, TWO ARTERIES FROM LEFT INTERNAL MAMMARY, OPEN APPROACH: ICD-10-PCS | Performed by: THORACIC SURGERY (CARDIOTHORACIC VASCULAR SURGERY)

## 2018-03-16 PROCEDURE — 93005 ELECTROCARDIOGRAM TRACING: CPT

## 2018-03-16 PROCEDURE — 2500000003 HC RX 250 WO HCPCS

## 2018-03-16 PROCEDURE — 2580000003 HC RX 258: Performed by: THORACIC SURGERY (CARDIOTHORACIC VASCULAR SURGERY)

## 2018-03-16 PROCEDURE — 2500000003 HC RX 250 WO HCPCS: Performed by: THORACIC SURGERY (CARDIOTHORACIC VASCULAR SURGERY)

## 2018-03-16 PROCEDURE — 83605 ASSAY OF LACTIC ACID: CPT

## 2018-03-16 PROCEDURE — L8670 VASCULAR GRAFT, SYNTHETIC: HCPCS | Performed by: THORACIC SURGERY (CARDIOTHORACIC VASCULAR SURGERY)

## 2018-03-16 PROCEDURE — 85025 COMPLETE CBC W/AUTO DIFF WBC: CPT

## 2018-03-16 PROCEDURE — 33534 CABG ARTERIAL TWO: CPT | Performed by: THORACIC SURGERY (CARDIOTHORACIC VASCULAR SURGERY)

## 2018-03-16 PROCEDURE — 85730 THROMBOPLASTIN TIME PARTIAL: CPT

## 2018-03-16 PROCEDURE — P9041 ALBUMIN (HUMAN),5%, 50ML: HCPCS

## 2018-03-16 PROCEDURE — 85384 FIBRINOGEN ACTIVITY: CPT

## 2018-03-16 PROCEDURE — X2RF032 REPLACEMENT OF AORTIC VALVE USING ZOOPLASTIC TISSUE, RAPID DEPLOYMENT TECHNIQUE, OPEN APPROACH, NEW TECHNOLOGY GROUP 2: ICD-10-PCS | Performed by: THORACIC SURGERY (CARDIOTHORACIC VASCULAR SURGERY)

## 2018-03-16 PROCEDURE — 71250 CT THORAX DX C-: CPT

## 2018-03-16 PROCEDURE — 94640 AIRWAY INHALATION TREATMENT: CPT

## 2018-03-16 PROCEDURE — 2780000006 HC MISC HEART VALVE: Performed by: THORACIC SURGERY (CARDIOTHORACIC VASCULAR SURGERY)

## 2018-03-16 PROCEDURE — 3700000000 HC ANESTHESIA ATTENDED CARE: Performed by: THORACIC SURGERY (CARDIOTHORACIC VASCULAR SURGERY)

## 2018-03-16 PROCEDURE — 02RX0JZ REPLACEMENT OF THORACIC AORTA, ASCENDING/ARCH WITH SYNTHETIC SUBSTITUTE, OPEN APPROACH: ICD-10-PCS | Performed by: THORACIC SURGERY (CARDIOTHORACIC VASCULAR SURGERY)

## 2018-03-16 PROCEDURE — 33860 PR ASCEND AORTA GRAFT INCL VAVLE SUSPENSION: CPT | Performed by: THORACIC SURGERY (CARDIOTHORACIC VASCULAR SURGERY)

## 2018-03-16 PROCEDURE — C1729 CATH, DRAINAGE: HCPCS | Performed by: THORACIC SURGERY (CARDIOTHORACIC VASCULAR SURGERY)

## 2018-03-16 PROCEDURE — 85018 HEMOGLOBIN: CPT

## 2018-03-16 PROCEDURE — 84132 ASSAY OF SERUM POTASSIUM: CPT

## 2018-03-16 PROCEDURE — 84295 ASSAY OF SERUM SODIUM: CPT

## 2018-03-16 PROCEDURE — 36415 COLL VENOUS BLD VENIPUNCTURE: CPT

## 2018-03-16 PROCEDURE — 5A1221Z PERFORMANCE OF CARDIAC OUTPUT, CONTINUOUS: ICD-10-PCS | Performed by: THORACIC SURGERY (CARDIOTHORACIC VASCULAR SURGERY)

## 2018-03-16 PROCEDURE — 85576 BLOOD PLATELET AGGREGATION: CPT

## 2018-03-16 PROCEDURE — 2580000003 HC RX 258

## 2018-03-16 PROCEDURE — 2580000003 HC RX 258: Performed by: NURSE ANESTHETIST, CERTIFIED REGISTERED

## 2018-03-16 PROCEDURE — S0028 INJECTION, FAMOTIDINE, 20 MG: HCPCS | Performed by: THORACIC SURGERY (CARDIOTHORACIC VASCULAR SURGERY)

## 2018-03-16 PROCEDURE — 85390 FIBRINOLYSINS SCREEN I&R: CPT

## 2018-03-16 PROCEDURE — 2100000001 HC CVICU R&B

## 2018-03-16 PROCEDURE — 71045 X-RAY EXAM CHEST 1 VIEW: CPT

## 2018-03-16 PROCEDURE — 2780000010 HC IMPLANT OTHER: Performed by: THORACIC SURGERY (CARDIOTHORACIC VASCULAR SURGERY)

## 2018-03-16 PROCEDURE — 80048 BASIC METABOLIC PNL TOTAL CA: CPT

## 2018-03-16 PROCEDURE — 85610 PROTHROMBIN TIME: CPT

## 2018-03-16 PROCEDURE — 33405 REPLACEMENT AORTIC VALVE OPN: CPT | Performed by: THORACIC SURGERY (CARDIOTHORACIC VASCULAR SURGERY)

## 2018-03-16 PROCEDURE — 83735 ASSAY OF MAGNESIUM: CPT

## 2018-03-16 PROCEDURE — 88304 TISSUE EXAM BY PATHOLOGIST: CPT

## 2018-03-16 PROCEDURE — 94002 VENT MGMT INPAT INIT DAY: CPT

## 2018-03-16 PROCEDURE — B24BZZ4 ULTRASONOGRAPHY OF HEART WITH AORTA, TRANSESOPHAGEAL: ICD-10-PCS | Performed by: ANESTHESIOLOGY

## 2018-03-16 PROCEDURE — 88305 TISSUE EXAM BY PATHOLOGIST: CPT

## 2018-03-16 PROCEDURE — 85027 COMPLETE CBC AUTOMATED: CPT

## 2018-03-16 PROCEDURE — 3600000008 HC SURGERY OHS BASE: Performed by: THORACIC SURGERY (CARDIOTHORACIC VASCULAR SURGERY)

## 2018-03-16 PROCEDURE — 82330 ASSAY OF CALCIUM: CPT

## 2018-03-16 PROCEDURE — 88311 DECALCIFY TISSUE: CPT

## 2018-03-16 PROCEDURE — 85049 AUTOMATED PLATELET COUNT: CPT

## 2018-03-16 DEVICE — U-SHAPED STYLE, SILICONE (1 PER STERILE PKG)
Type: IMPLANTABLE DEVICE | Site: HEART | Status: FUNCTIONAL
Brand: SCANLAN® A/C LOCATOR® GRAFT MARKER

## 2018-03-16 DEVICE — GRAFT VASC L30CM BOR 28MM THORACOABDOMINAL POLY GEL SEAL: Type: IMPLANTABLE DEVICE | Site: HEART | Status: FUNCTIONAL

## 2018-03-16 DEVICE — IMPLANTABLE DEVICE: Type: IMPLANTABLE DEVICE | Site: HEART | Status: FUNCTIONAL

## 2018-03-16 RX ORDER — SODIUM CHLORIDE 9 MG/ML
INJECTION, SOLUTION INTRAVENOUS CONTINUOUS
Status: DISCONTINUED | OUTPATIENT
Start: 2018-03-16 | End: 2018-03-19

## 2018-03-16 RX ORDER — ALBUMIN, HUMAN INJ 5% 5 %
SOLUTION INTRAVENOUS
Status: COMPLETED
Start: 2018-03-16 | End: 2018-03-16

## 2018-03-16 RX ORDER — POTASSIUM CHLORIDE 29.8 MG/ML
20 INJECTION INTRAVENOUS PRN
Status: DISCONTINUED | OUTPATIENT
Start: 2018-03-16 | End: 2018-03-20 | Stop reason: HOSPADM

## 2018-03-16 RX ORDER — PROPOFOL 10 MG/ML
INJECTION, EMULSION INTRAVENOUS CONTINUOUS PRN
Status: DISCONTINUED | OUTPATIENT
Start: 2018-03-16 | End: 2018-03-16 | Stop reason: SDUPTHER

## 2018-03-16 RX ORDER — SODIUM CHLORIDE 0.9 % (FLUSH) 0.9 %
10 SYRINGE (ML) INJECTION EVERY 12 HOURS SCHEDULED
Status: DISCONTINUED | OUTPATIENT
Start: 2018-03-16 | End: 2018-03-16 | Stop reason: HOSPADM

## 2018-03-16 RX ORDER — HYDRALAZINE HYDROCHLORIDE 20 MG/ML
5 INJECTION INTRAMUSCULAR; INTRAVENOUS EVERY 5 MIN PRN
Status: DISCONTINUED | OUTPATIENT
Start: 2018-03-16 | End: 2018-03-20 | Stop reason: HOSPADM

## 2018-03-16 RX ORDER — M-VIT,TX,IRON,MINS/CALC/FOLIC 27MG-0.4MG
1 TABLET ORAL
Status: DISCONTINUED | OUTPATIENT
Start: 2018-03-17 | End: 2018-03-20 | Stop reason: HOSPADM

## 2018-03-16 RX ORDER — ONDANSETRON 2 MG/ML
4 INJECTION INTRAMUSCULAR; INTRAVENOUS EVERY 8 HOURS PRN
Status: DISCONTINUED | OUTPATIENT
Start: 2018-03-16 | End: 2018-03-20 | Stop reason: HOSPADM

## 2018-03-16 RX ORDER — PROPOFOL 10 MG/ML
10 INJECTION, EMULSION INTRAVENOUS
Status: DISCONTINUED | OUTPATIENT
Start: 2018-03-16 | End: 2018-03-17

## 2018-03-16 RX ORDER — FENTANYL CITRATE 50 UG/ML
INJECTION, SOLUTION INTRAMUSCULAR; INTRAVENOUS CONTINUOUS PRN
Status: DISCONTINUED | OUTPATIENT
Start: 2018-03-16 | End: 2018-03-16

## 2018-03-16 RX ORDER — KETOROLAC TROMETHAMINE 30 MG/ML
30 INJECTION, SOLUTION INTRAMUSCULAR; INTRAVENOUS ONCE
Status: COMPLETED | OUTPATIENT
Start: 2018-03-16 | End: 2018-03-16

## 2018-03-16 RX ORDER — PAPAVERINE HYDROCHLORIDE 30 MG/ML
INJECTION INTRAMUSCULAR; INTRAVENOUS PRN
Status: DISCONTINUED | OUTPATIENT
Start: 2018-03-16 | End: 2018-03-16 | Stop reason: HOSPADM

## 2018-03-16 RX ORDER — FENTANYL CITRATE 50 UG/ML
50 INJECTION, SOLUTION INTRAMUSCULAR; INTRAVENOUS
Status: DISCONTINUED | OUTPATIENT
Start: 2018-03-16 | End: 2018-03-20 | Stop reason: HOSPADM

## 2018-03-16 RX ORDER — PROTAMINE SULFATE 10 MG/ML
INJECTION, SOLUTION INTRAVENOUS PRN
Status: DISCONTINUED | OUTPATIENT
Start: 2018-03-16 | End: 2018-03-16 | Stop reason: SDUPTHER

## 2018-03-16 RX ORDER — ASPIRIN 81 MG/1
81 TABLET ORAL DAILY
Status: DISCONTINUED | OUTPATIENT
Start: 2018-03-16 | End: 2018-03-16

## 2018-03-16 RX ORDER — SODIUM CHLORIDE 0.9 % (FLUSH) 0.9 %
10 SYRINGE (ML) INJECTION PRN
Status: DISCONTINUED | OUTPATIENT
Start: 2018-03-16 | End: 2018-03-16 | Stop reason: HOSPADM

## 2018-03-16 RX ORDER — ALBUMIN, HUMAN INJ 5% 5 %
25 SOLUTION INTRAVENOUS ONCE
Status: COMPLETED | OUTPATIENT
Start: 2018-03-16 | End: 2018-03-16

## 2018-03-16 RX ORDER — ASPIRIN 81 MG/1
81 TABLET ORAL DAILY
Status: DISCONTINUED | OUTPATIENT
Start: 2018-03-16 | End: 2018-03-19

## 2018-03-16 RX ORDER — ALBUTEROL SULFATE 90 UG/1
AEROSOL, METERED RESPIRATORY (INHALATION) PRN
Status: DISCONTINUED | OUTPATIENT
Start: 2018-03-16 | End: 2018-03-16 | Stop reason: SDUPTHER

## 2018-03-16 RX ORDER — NICOTINE POLACRILEX 4 MG
15 LOZENGE BUCCAL PRN
Status: DISCONTINUED | OUTPATIENT
Start: 2018-03-16 | End: 2018-03-20 | Stop reason: HOSPADM

## 2018-03-16 RX ORDER — FENTANYL CITRATE 50 UG/ML
INJECTION, SOLUTION INTRAMUSCULAR; INTRAVENOUS PRN
Status: DISCONTINUED | OUTPATIENT
Start: 2018-03-16 | End: 2018-03-16 | Stop reason: SDUPTHER

## 2018-03-16 RX ORDER — CLOPIDOGREL BISULFATE 75 MG/1
75 TABLET ORAL DAILY
Status: DISCONTINUED | OUTPATIENT
Start: 2018-03-17 | End: 2018-03-20 | Stop reason: HOSPADM

## 2018-03-16 RX ORDER — SIMVASTATIN 20 MG
20 TABLET ORAL NIGHTLY
Status: DISCONTINUED | OUTPATIENT
Start: 2018-03-17 | End: 2018-03-19

## 2018-03-16 RX ORDER — METOCLOPRAMIDE HYDROCHLORIDE 5 MG/ML
10 INJECTION INTRAMUSCULAR; INTRAVENOUS EVERY 6 HOURS PRN
Status: DISCONTINUED | OUTPATIENT
Start: 2018-03-16 | End: 2018-03-16

## 2018-03-16 RX ORDER — LIDOCAINE HYDROCHLORIDE 10 MG/ML
INJECTION, SOLUTION INFILTRATION; PERINEURAL PRN
Status: DISCONTINUED | OUTPATIENT
Start: 2018-03-16 | End: 2018-03-16 | Stop reason: SDUPTHER

## 2018-03-16 RX ORDER — DIPHENHYDRAMINE HCL 25 MG
25 TABLET ORAL NIGHTLY PRN
Status: DISCONTINUED | OUTPATIENT
Start: 2018-03-17 | End: 2018-03-20 | Stop reason: HOSPADM

## 2018-03-16 RX ORDER — MEPERIDINE HYDROCHLORIDE 50 MG/ML
25 INJECTION INTRAMUSCULAR; INTRAVENOUS; SUBCUTANEOUS
Status: ACTIVE | OUTPATIENT
Start: 2018-03-16 | End: 2018-03-16

## 2018-03-16 RX ORDER — AMIODARONE HYDROCHLORIDE 200 MG/1
200 TABLET ORAL 3 TIMES DAILY
Status: DISCONTINUED | OUTPATIENT
Start: 2018-03-16 | End: 2018-03-16

## 2018-03-16 RX ORDER — SODIUM CHLORIDE, SODIUM LACTATE, POTASSIUM CHLORIDE, CALCIUM CHLORIDE 600; 310; 30; 20 MG/100ML; MG/100ML; MG/100ML; MG/100ML
INJECTION, SOLUTION INTRAVENOUS CONTINUOUS PRN
Status: DISCONTINUED | OUTPATIENT
Start: 2018-03-16 | End: 2018-03-16 | Stop reason: SDUPTHER

## 2018-03-16 RX ORDER — MIDAZOLAM HYDROCHLORIDE 1 MG/ML
INJECTION INTRAMUSCULAR; INTRAVENOUS PRN
Status: DISCONTINUED | OUTPATIENT
Start: 2018-03-16 | End: 2018-03-16 | Stop reason: SDUPTHER

## 2018-03-16 RX ORDER — AMIODARONE HYDROCHLORIDE 200 MG/1
200 TABLET ORAL 3 TIMES DAILY
Status: DISCONTINUED | OUTPATIENT
Start: 2018-03-16 | End: 2018-03-18

## 2018-03-16 RX ORDER — MAGNESIUM SULFATE 1 G/100ML
1 INJECTION INTRAVENOUS PRN
Status: DISCONTINUED | OUTPATIENT
Start: 2018-03-16 | End: 2018-03-20 | Stop reason: HOSPADM

## 2018-03-16 RX ORDER — CHLORHEXIDINE GLUCONATE 0.12 MG/ML
15 RINSE ORAL ONCE
Status: DISCONTINUED | OUTPATIENT
Start: 2018-03-16 | End: 2018-03-16

## 2018-03-16 RX ORDER — FUROSEMIDE 10 MG/ML
40 INJECTION INTRAMUSCULAR; INTRAVENOUS ONCE
Status: COMPLETED | OUTPATIENT
Start: 2018-03-16 | End: 2018-03-16

## 2018-03-16 RX ORDER — PROPOFOL 10 MG/ML
INJECTION, EMULSION INTRAVENOUS PRN
Status: DISCONTINUED | OUTPATIENT
Start: 2018-03-16 | End: 2018-03-16 | Stop reason: SDUPTHER

## 2018-03-16 RX ORDER — ACETAMINOPHEN 325 MG/1
650 TABLET ORAL EVERY 4 HOURS PRN
Status: DISCONTINUED | OUTPATIENT
Start: 2018-03-16 | End: 2018-03-20 | Stop reason: HOSPADM

## 2018-03-16 RX ORDER — SODIUM CHLORIDE 0.9 % (FLUSH) 0.9 %
10 SYRINGE (ML) INJECTION PRN
Status: DISCONTINUED | OUTPATIENT
Start: 2018-03-16 | End: 2018-03-20 | Stop reason: HOSPADM

## 2018-03-16 RX ORDER — ALBUTEROL SULFATE 2.5 MG/3ML
2.5 SOLUTION RESPIRATORY (INHALATION)
Status: DISCONTINUED | OUTPATIENT
Start: 2018-03-16 | End: 2018-03-17

## 2018-03-16 RX ORDER — OXYCODONE HYDROCHLORIDE AND ACETAMINOPHEN 5; 325 MG/1; MG/1
2 TABLET ORAL EVERY 4 HOURS PRN
Status: DISCONTINUED | OUTPATIENT
Start: 2018-03-16 | End: 2018-03-20 | Stop reason: HOSPADM

## 2018-03-16 RX ORDER — KETOROLAC TROMETHAMINE 15 MG/ML
15 INJECTION, SOLUTION INTRAMUSCULAR; INTRAVENOUS EVERY 6 HOURS
Status: DISPENSED | OUTPATIENT
Start: 2018-03-16 | End: 2018-03-18

## 2018-03-16 RX ORDER — SODIUM CHLORIDE 9 MG/ML
INJECTION, SOLUTION INTRAVENOUS CONTINUOUS
Status: DISCONTINUED | OUTPATIENT
Start: 2018-03-16 | End: 2018-03-16

## 2018-03-16 RX ORDER — ASPIRIN 300 MG/1
150 SUPPOSITORY RECTAL ONCE
Status: COMPLETED | OUTPATIENT
Start: 2018-03-16 | End: 2018-03-16

## 2018-03-16 RX ORDER — HEPARIN SODIUM 1000 [USP'U]/ML
INJECTION, SOLUTION INTRAVENOUS; SUBCUTANEOUS PRN
Status: DISCONTINUED | OUTPATIENT
Start: 2018-03-16 | End: 2018-03-16 | Stop reason: SDUPTHER

## 2018-03-16 RX ORDER — ROCURONIUM BROMIDE 10 MG/ML
INJECTION, SOLUTION INTRAVENOUS PRN
Status: DISCONTINUED | OUTPATIENT
Start: 2018-03-16 | End: 2018-03-16 | Stop reason: SDUPTHER

## 2018-03-16 RX ORDER — CHLORHEXIDINE GLUCONATE 0.12 MG/ML
15 RINSE ORAL 2 TIMES DAILY
Status: DISCONTINUED | OUTPATIENT
Start: 2018-03-16 | End: 2018-03-20 | Stop reason: HOSPADM

## 2018-03-16 RX ORDER — SODIUM CHLORIDE 9 MG/ML
INJECTION, SOLUTION INTRAVENOUS CONTINUOUS PRN
Status: DISCONTINUED | OUTPATIENT
Start: 2018-03-16 | End: 2018-03-16 | Stop reason: SDUPTHER

## 2018-03-16 RX ORDER — AMIODARONE HYDROCHLORIDE 50 MG/ML
INJECTION, SOLUTION INTRAVENOUS PRN
Status: DISCONTINUED | OUTPATIENT
Start: 2018-03-16 | End: 2018-03-16 | Stop reason: SDUPTHER

## 2018-03-16 RX ORDER — EPHEDRINE SULFATE 50 MG/ML
INJECTION INTRAVENOUS PRN
Status: DISCONTINUED | OUTPATIENT
Start: 2018-03-16 | End: 2018-03-16 | Stop reason: SDUPTHER

## 2018-03-16 RX ORDER — DEXTROSE MONOHYDRATE 50 MG/ML
100 INJECTION, SOLUTION INTRAVENOUS PRN
Status: DISCONTINUED | OUTPATIENT
Start: 2018-03-16 | End: 2018-03-20 | Stop reason: HOSPADM

## 2018-03-16 RX ORDER — DEXTROSE MONOHYDRATE 25 G/50ML
12.5 INJECTION, SOLUTION INTRAVENOUS PRN
Status: DISCONTINUED | OUTPATIENT
Start: 2018-03-16 | End: 2018-03-20 | Stop reason: HOSPADM

## 2018-03-16 RX ADMIN — MIDAZOLAM HYDROCHLORIDE 2 MG: 1 INJECTION, SOLUTION INTRAMUSCULAR; INTRAVENOUS at 07:07

## 2018-03-16 RX ADMIN — MUPIROCIN: 20 OINTMENT TOPICAL at 06:24

## 2018-03-16 RX ADMIN — FENTANYL CITRATE 250 MCG: 50 INJECTION INTRAMUSCULAR; INTRAVENOUS at 08:24

## 2018-03-16 RX ADMIN — Medication 6 PUFF: at 07:21

## 2018-03-16 RX ADMIN — FENTANYL CITRATE 250 MCG: 50 INJECTION INTRAMUSCULAR; INTRAVENOUS at 08:30

## 2018-03-16 RX ADMIN — Medication 1500 MG: at 07:56

## 2018-03-16 RX ADMIN — PROPOFOL 25 MCG/KG/MIN: 10 INJECTION, EMULSION INTRAVENOUS at 14:35

## 2018-03-16 RX ADMIN — SODIUM CHLORIDE: 900 INJECTION, SOLUTION INTRAVENOUS at 11:49

## 2018-03-16 RX ADMIN — PHENYLEPHRINE HYDROCHLORIDE 200 MCG: 10 INJECTION INTRAVENOUS at 07:52

## 2018-03-16 RX ADMIN — ALBUTEROL SULFATE 2.5 MG: 2.5 SOLUTION RESPIRATORY (INHALATION) at 15:44

## 2018-03-16 RX ADMIN — ZOLPIDEM TARTRATE 5 MG: 5 TABLET ORAL at 01:30

## 2018-03-16 RX ADMIN — ROCURONIUM BROMIDE 70 MG: 10 INJECTION INTRAVENOUS at 07:13

## 2018-03-16 RX ADMIN — ROCURONIUM BROMIDE 30 MG: 10 INJECTION INTRAVENOUS at 07:55

## 2018-03-16 RX ADMIN — ALBUMIN (HUMAN) 25 G: 12.5 INJECTION, SOLUTION INTRAVENOUS at 15:09

## 2018-03-16 RX ADMIN — EPINEPHRINE 0.01 MCG/KG/MIN: 1 INJECTION PARENTERAL at 20:20

## 2018-03-16 RX ADMIN — FENTANYL CITRATE 100 MCG: 50 INJECTION INTRAMUSCULAR; INTRAVENOUS at 07:53

## 2018-03-16 RX ADMIN — ASPIRIN 150 MG: 300 SUPPOSITORY RECTAL at 15:25

## 2018-03-16 RX ADMIN — PHENYLEPHRINE HYDROCHLORIDE 200 MCG: 10 INJECTION INTRAVENOUS at 07:15

## 2018-03-16 RX ADMIN — AMIODARONE HYDROCHLORIDE 200 MG: 200 TABLET ORAL at 05:49

## 2018-03-16 RX ADMIN — Medication 0.02 MCG/KG/MIN: at 08:04

## 2018-03-16 RX ADMIN — LIDOCAINE HYDROCHLORIDE 50 MG: 10 INJECTION, SOLUTION EPIDURAL; INFILTRATION; INTRACAUDAL; PERINEURAL at 07:13

## 2018-03-16 RX ADMIN — DEXTROSE 1 MG: 5 SOLUTION INTRAVENOUS at 11:55

## 2018-03-16 RX ADMIN — PROPOFOL 130 MG: 10 INJECTION, EMULSION INTRAVENOUS at 07:13

## 2018-03-16 RX ADMIN — AMIODARONE HYDROCHLORIDE 150 MG: 50 INJECTION, SOLUTION INTRAVENOUS at 11:34

## 2018-03-16 RX ADMIN — ROCURONIUM BROMIDE 50 MG: 10 INJECTION INTRAVENOUS at 08:23

## 2018-03-16 RX ADMIN — SODIUM CHLORIDE, POTASSIUM CHLORIDE, SODIUM LACTATE AND CALCIUM CHLORIDE: 600; 310; 30; 20 INJECTION, SOLUTION INTRAVENOUS at 07:20

## 2018-03-16 RX ADMIN — SODIUM CHLORIDE 2 UNITS: 9 INJECTION, SOLUTION INTRAVENOUS at 08:04

## 2018-03-16 RX ADMIN — ALBUMIN (HUMAN) 25 G: 12.5 INJECTION, SOLUTION INTRAVENOUS at 21:00

## 2018-03-16 RX ADMIN — ALBUMIN, HUMAN INJ 5% 25 G: 5 SOLUTION at 21:00

## 2018-03-16 RX ADMIN — AMIODARONE HYDROCHLORIDE 150 MG: 50 INJECTION, SOLUTION INTRAVENOUS at 11:21

## 2018-03-16 RX ADMIN — METOPROLOL TARTRATE 12.5 MG: 25 TABLET ORAL at 05:49

## 2018-03-16 RX ADMIN — PROPOFOL 30 MCG/KG/MIN: 10 INJECTION, EMULSION INTRAVENOUS at 20:20

## 2018-03-16 RX ADMIN — DEXTROSE 1 MG/MIN: 5 SOLUTION INTRAVENOUS at 18:36

## 2018-03-16 RX ADMIN — OXYCODONE HYDROCHLORIDE AND ACETAMINOPHEN 2 TABLET: 5; 325 TABLET ORAL at 21:09

## 2018-03-16 RX ADMIN — FAMOTIDINE 20 MG: 10 INJECTION INTRAVENOUS at 15:24

## 2018-03-16 RX ADMIN — NICARDIPINE HYDROCHLORIDE 5 MG/HR: 0.1 INJECTION, SOLUTION INTRAVENOUS at 18:45

## 2018-03-16 RX ADMIN — ASPIRIN 81 MG: 81 TABLET, COATED ORAL at 06:23

## 2018-03-16 RX ADMIN — CHLORHEXIDINE GLUCONATE 15 ML: 1.2 RINSE ORAL at 05:49

## 2018-03-16 RX ADMIN — SODIUM CHLORIDE, POTASSIUM CHLORIDE, SODIUM LACTATE AND CALCIUM CHLORIDE: 600; 310; 30; 20 INJECTION, SOLUTION INTRAVENOUS at 07:01

## 2018-03-16 RX ADMIN — HEPARIN SODIUM 36000 UNITS: 1000 INJECTION, SOLUTION INTRAVENOUS; SUBCUTANEOUS at 08:55

## 2018-03-16 RX ADMIN — FENTANYL CITRATE 250 MCG: 50 INJECTION INTRAMUSCULAR; INTRAVENOUS at 08:27

## 2018-03-16 RX ADMIN — HYDRALAZINE HYDROCHLORIDE 5 MG: 20 INJECTION INTRAMUSCULAR; INTRAVENOUS at 18:39

## 2018-03-16 RX ADMIN — FUROSEMIDE 40 MG: 10 INJECTION, SOLUTION INTRAMUSCULAR; INTRAVENOUS at 14:28

## 2018-03-16 RX ADMIN — PHENYLEPHRINE HYDROCHLORIDE 100 MCG: 10 INJECTION INTRAVENOUS at 08:38

## 2018-03-16 RX ADMIN — CHLORHEXIDINE GLUCONATE 15 ML: 1.2 RINSE ORAL at 15:24

## 2018-03-16 RX ADMIN — EPHEDRINE SULFATE 5 MG: 50 INJECTION, SOLUTION INTRAVENOUS at 07:48

## 2018-03-16 RX ADMIN — PROPOFOL 25 MCG/KG/MIN: 10 INJECTION, EMULSION INTRAVENOUS at 12:38

## 2018-03-16 RX ADMIN — ALPRAZOLAM 0.5 MG: 0.5 TABLET ORAL at 01:30

## 2018-03-16 RX ADMIN — KETOROLAC TROMETHAMINE 30 MG: 30 INJECTION, SOLUTION INTRAMUSCULAR at 19:12

## 2018-03-16 RX ADMIN — SODIUM CHLORIDE, POTASSIUM CHLORIDE, SODIUM LACTATE AND CALCIUM CHLORIDE: 600; 310; 30; 20 INJECTION, SOLUTION INTRAVENOUS at 08:20

## 2018-03-16 RX ADMIN — EPHEDRINE SULFATE 5 MG: 50 INJECTION, SOLUTION INTRAVENOUS at 07:34

## 2018-03-16 RX ADMIN — MIDAZOLAM HYDROCHLORIDE 2 MG: 1 INJECTION, SOLUTION INTRAMUSCULAR; INTRAVENOUS at 12:34

## 2018-03-16 RX ADMIN — Medication 81 MG: at 15:24

## 2018-03-16 RX ADMIN — FENTANYL CITRATE 150 MCG: 50 INJECTION INTRAMUSCULAR; INTRAVENOUS at 07:13

## 2018-03-16 RX ADMIN — FAMOTIDINE 20 MG: 10 INJECTION INTRAVENOUS at 20:40

## 2018-03-16 RX ADMIN — CHLORHEXIDINE GLUCONATE 15 ML: 1.2 RINSE ORAL at 20:41

## 2018-03-16 RX ADMIN — SODIUM CHLORIDE, POTASSIUM CHLORIDE, SODIUM LACTATE AND CALCIUM CHLORIDE: 600; 310; 30; 20 INJECTION, SOLUTION INTRAVENOUS at 13:07

## 2018-03-16 RX ADMIN — ALBUMIN, HUMAN INJ 5% 25 G: 5 SOLUTION at 15:09

## 2018-03-16 RX ADMIN — ALBUTEROL SULFATE 2.5 MG: 2.5 SOLUTION RESPIRATORY (INHALATION) at 19:50

## 2018-03-16 RX ADMIN — Medication 1500 MG: at 20:49

## 2018-03-16 RX ADMIN — PHENYLEPHRINE HYDROCHLORIDE 100 MCG: 10 INJECTION INTRAVENOUS at 09:01

## 2018-03-16 RX ADMIN — MIDAZOLAM HYDROCHLORIDE 2 MG: 1 INJECTION, SOLUTION INTRAMUSCULAR; INTRAVENOUS at 07:03

## 2018-03-16 RX ADMIN — VASOPRESSIN 2 UNITS/HR: 20 INJECTION INTRAVENOUS at 20:24

## 2018-03-16 RX ADMIN — ROCURONIUM BROMIDE 50 MG: 10 INJECTION INTRAVENOUS at 11:51

## 2018-03-16 RX ADMIN — SODIUM CHLORIDE: 9 INJECTION, SOLUTION INTRAVENOUS at 14:28

## 2018-03-16 RX ADMIN — PHENYLEPHRINE HYDROCHLORIDE 100 MCG: 10 INJECTION INTRAVENOUS at 08:05

## 2018-03-16 RX ADMIN — DEXMEDETOMIDINE HYDROCHLORIDE 0.2 MCG/KG/HR: 100 INJECTION, SOLUTION INTRAVENOUS at 19:34

## 2018-03-16 RX ADMIN — SODIUM CHLORIDE, POTASSIUM CHLORIDE, SODIUM LACTATE AND CALCIUM CHLORIDE: 600; 310; 30; 20 INJECTION, SOLUTION INTRAVENOUS at 07:29

## 2018-03-16 RX ADMIN — PROTAMINE SULFATE 250 MG: 10 INJECTION, SOLUTION INTRAVENOUS at 12:18

## 2018-03-16 RX ADMIN — DEXTROSE 0.5 MG/MIN: 5 SOLUTION INTRAVENOUS at 20:35

## 2018-03-16 RX ADMIN — VASOPRESSIN 2 UNITS/HR: 20 INJECTION INTRAVENOUS at 12:03

## 2018-03-16 ASSESSMENT — PULMONARY FUNCTION TESTS
PIF_VALUE: 32
PIF_VALUE: 36
PIF_VALUE: 1
PIF_VALUE: 1
PIF_VALUE: 36
PIF_VALUE: 36
PIF_VALUE: 3
PIF_VALUE: 2
PIF_VALUE: 2
PIF_VALUE: 32
PIF_VALUE: 30
PIF_VALUE: 1
PIF_VALUE: 37
PIF_VALUE: 27
PIF_VALUE: 37
PIF_VALUE: 1
PIF_VALUE: 24
PIF_VALUE: 1
PIF_VALUE: 38
PIF_VALUE: 27
PIF_VALUE: 28
PIF_VALUE: 2
PIF_VALUE: 30
PIF_VALUE: 34
PIF_VALUE: 32
PIF_VALUE: 28
PIF_VALUE: 33
PIF_VALUE: 27
PIF_VALUE: 1
PIF_VALUE: 33
PIF_VALUE: 1
PIF_VALUE: 20
PIF_VALUE: 27
PIF_VALUE: 27
PIF_VALUE: 36
PIF_VALUE: 28
PIF_VALUE: 1
PIF_VALUE: 25
PIF_VALUE: 1
PIF_VALUE: 28
PIF_VALUE: 1
PIF_VALUE: 35
PIF_VALUE: 1
PIF_VALUE: 3
PIF_VALUE: 35
PIF_VALUE: 27
PIF_VALUE: 35
PIF_VALUE: 33
PIF_VALUE: 3
PIF_VALUE: 26
PIF_VALUE: 1
PIF_VALUE: 31
PIF_VALUE: 3
PIF_VALUE: 32
PIF_VALUE: 28
PIF_VALUE: 38
PIF_VALUE: 36
PIF_VALUE: 4
PIF_VALUE: 1
PIF_VALUE: 38
PIF_VALUE: 26
PIF_VALUE: 26
PIF_VALUE: 1
PIF_VALUE: 29
PIF_VALUE: 35
PIF_VALUE: 33
PIF_VALUE: 1
PIF_VALUE: 2
PIF_VALUE: 30
PIF_VALUE: 33
PIF_VALUE: 31
PIF_VALUE: 35
PIF_VALUE: 24
PIF_VALUE: 27
PIF_VALUE: 1
PIF_VALUE: 1
PIF_VALUE: 7
PIF_VALUE: 27
PIF_VALUE: 28
PIF_VALUE: 1
PIF_VALUE: 1
PIF_VALUE: 33
PIF_VALUE: 0
PIF_VALUE: 1
PIF_VALUE: 24
PIF_VALUE: 39
PIF_VALUE: 32
PIF_VALUE: 40
PIF_VALUE: 24
PIF_VALUE: 29
PIF_VALUE: 1
PIF_VALUE: 3
PIF_VALUE: 33
PIF_VALUE: 30
PIF_VALUE: 35
PIF_VALUE: 35
PIF_VALUE: 28
PIF_VALUE: 28
PIF_VALUE: 1
PIF_VALUE: 2
PIF_VALUE: 1
PIF_VALUE: 28
PIF_VALUE: 1
PIF_VALUE: 29
PIF_VALUE: 35
PIF_VALUE: 1
PIF_VALUE: 1
PIF_VALUE: 37
PIF_VALUE: 40
PIF_VALUE: 1
PIF_VALUE: 28
PIF_VALUE: 37
PIF_VALUE: 1
PIF_VALUE: 34
PIF_VALUE: 31
PIF_VALUE: 25
PIF_VALUE: 0
PIF_VALUE: 1
PIF_VALUE: 1
PIF_VALUE: 37
PIF_VALUE: 32
PIF_VALUE: 28
PIF_VALUE: 34
PIF_VALUE: 1
PIF_VALUE: 37
PIF_VALUE: 33
PIF_VALUE: 1
PIF_VALUE: 33
PIF_VALUE: 1
PIF_VALUE: 32
PIF_VALUE: 2
PIF_VALUE: 1
PIF_VALUE: 2
PIF_VALUE: 5
PIF_VALUE: 30
PIF_VALUE: 1
PIF_VALUE: 1
PIF_VALUE: 29
PIF_VALUE: 1
PIF_VALUE: 31
PIF_VALUE: 32
PIF_VALUE: 28
PIF_VALUE: 34
PIF_VALUE: 1
PIF_VALUE: 1
PIF_VALUE: 29
PIF_VALUE: 24
PIF_VALUE: 1
PIF_VALUE: 40
PIF_VALUE: 35
PIF_VALUE: 33
PIF_VALUE: 1
PIF_VALUE: 35
PIF_VALUE: 28
PIF_VALUE: 30
PIF_VALUE: 2
PIF_VALUE: 28
PIF_VALUE: 1
PIF_VALUE: 33
PIF_VALUE: 29
PIF_VALUE: 1
PIF_VALUE: 35
PIF_VALUE: 3
PIF_VALUE: 32
PIF_VALUE: 1
PIF_VALUE: 28
PIF_VALUE: 33
PIF_VALUE: 36
PIF_VALUE: 27
PIF_VALUE: 1
PIF_VALUE: 28
PIF_VALUE: 1
PIF_VALUE: 31
PIF_VALUE: 28
PIF_VALUE: 1
PIF_VALUE: 35
PIF_VALUE: 35
PIF_VALUE: 1
PIF_VALUE: 2
PIF_VALUE: 1
PIF_VALUE: 16
PIF_VALUE: 1
PIF_VALUE: 30
PIF_VALUE: 22
PIF_VALUE: 1
PIF_VALUE: 0
PIF_VALUE: 25
PIF_VALUE: 30
PIF_VALUE: 33
PIF_VALUE: 29
PIF_VALUE: 3
PIF_VALUE: 24
PIF_VALUE: 2
PIF_VALUE: 34
PIF_VALUE: 29
PIF_VALUE: 1
PIF_VALUE: 26
PIF_VALUE: 28
PIF_VALUE: 1
PIF_VALUE: 33
PIF_VALUE: 1
PIF_VALUE: 27
PIF_VALUE: 25
PIF_VALUE: 30
PIF_VALUE: 26
PIF_VALUE: 2
PIF_VALUE: 1
PIF_VALUE: 1
PIF_VALUE: 36
PIF_VALUE: 1
PIF_VALUE: 32
PIF_VALUE: 26
PIF_VALUE: 28
PIF_VALUE: 38
PIF_VALUE: 1
PIF_VALUE: 35
PIF_VALUE: 28
PIF_VALUE: 38
PIF_VALUE: 1
PIF_VALUE: 32
PIF_VALUE: 2
PIF_VALUE: 28
PIF_VALUE: 35
PIF_VALUE: 28
PIF_VALUE: 27
PIF_VALUE: 3
PIF_VALUE: 27
PIF_VALUE: 1
PIF_VALUE: 28
PIF_VALUE: 36
PIF_VALUE: 25
PIF_VALUE: 34
PIF_VALUE: 28
PIF_VALUE: 28
PIF_VALUE: 33
PIF_VALUE: 26
PIF_VALUE: 1
PIF_VALUE: 1
PIF_VALUE: 33
PIF_VALUE: 33
PIF_VALUE: 36
PIF_VALUE: 1
PIF_VALUE: 36
PIF_VALUE: 1
PIF_VALUE: 1
PIF_VALUE: 32
PIF_VALUE: 3
PIF_VALUE: 34
PIF_VALUE: 2
PIF_VALUE: 1
PIF_VALUE: 36
PIF_VALUE: 24
PIF_VALUE: 2
PIF_VALUE: 36
PIF_VALUE: 3
PIF_VALUE: 24
PIF_VALUE: 32
PIF_VALUE: 27
PIF_VALUE: 27
PIF_VALUE: 35
PIF_VALUE: 27
PIF_VALUE: 28
PIF_VALUE: 36
PIF_VALUE: 1
PIF_VALUE: 3
PIF_VALUE: 1
PIF_VALUE: 24
PIF_VALUE: 1
PIF_VALUE: 1
PIF_VALUE: 30
PIF_VALUE: 1
PIF_VALUE: 1
PIF_VALUE: 27
PIF_VALUE: 1
PIF_VALUE: 33
PIF_VALUE: 1
PIF_VALUE: 32
PIF_VALUE: 2
PIF_VALUE: 1
PIF_VALUE: 32
PIF_VALUE: 26
PIF_VALUE: 1
PIF_VALUE: 36
PIF_VALUE: 2
PIF_VALUE: 2
PIF_VALUE: 39
PIF_VALUE: 24
PIF_VALUE: 2
PIF_VALUE: 25
PIF_VALUE: 34
PIF_VALUE: 1
PIF_VALUE: 25
PIF_VALUE: 28
PIF_VALUE: 31
PIF_VALUE: 1
PIF_VALUE: 29
PIF_VALUE: 36
PIF_VALUE: 28
PIF_VALUE: 31
PIF_VALUE: 35
PIF_VALUE: 26
PIF_VALUE: 28
PIF_VALUE: 3
PIF_VALUE: 4
PIF_VALUE: 34
PIF_VALUE: 1
PIF_VALUE: 40
PIF_VALUE: 1
PIF_VALUE: 35
PIF_VALUE: 35
PIF_VALUE: 31
PIF_VALUE: 33
PIF_VALUE: 1
PIF_VALUE: 1
PIF_VALUE: 28
PIF_VALUE: 1
PIF_VALUE: 1
PIF_VALUE: 28
PIF_VALUE: 1
PIF_VALUE: 1
PIF_VALUE: 33
PIF_VALUE: 1
PIF_VALUE: 31
PIF_VALUE: 28
PIF_VALUE: 30
PIF_VALUE: 1
PIF_VALUE: 34
PIF_VALUE: 1
PIF_VALUE: 29
PIF_VALUE: 28
PIF_VALUE: 34
PIF_VALUE: 2
PIF_VALUE: 1
PIF_VALUE: 28
PIF_VALUE: 1
PIF_VALUE: 31
PIF_VALUE: 1
PIF_VALUE: 27
PIF_VALUE: 1
PIF_VALUE: 38
PIF_VALUE: 35
PIF_VALUE: 34
PIF_VALUE: 1
PIF_VALUE: 33
PIF_VALUE: 3
PIF_VALUE: 1
PIF_VALUE: 2
PIF_VALUE: 27
PIF_VALUE: 27
PIF_VALUE: 36
PIF_VALUE: 1
PIF_VALUE: 33
PIF_VALUE: 1
PIF_VALUE: 33
PIF_VALUE: 31
PIF_VALUE: 34
PIF_VALUE: 28
PIF_VALUE: 28
PIF_VALUE: 1
PIF_VALUE: 1
PIF_VALUE: 32
PIF_VALUE: 32
PIF_VALUE: 1
PIF_VALUE: 1

## 2018-03-16 ASSESSMENT — COPD QUESTIONNAIRES: CAT_SEVERITY: NO INTERVAL CHANGE

## 2018-03-16 ASSESSMENT — LIFESTYLE VARIABLES: SMOKING_STATUS: 1

## 2018-03-16 NOTE — PROGRESS NOTES
Nutrition Assessment    Type and Reason for Visit: Initial    Nutrition Recommendations: Recommend Glucerna tube feed (Diabetic formula higher in protein than standard house) goal of 55 ml per hour to provide 1584 kcal, 79 g protein    Malnutrition Assessment:  · Malnutrition Status: No malnutrition  Nutrition Diagnosis:   · Problem: Inadequate oral intake  · Etiology: related to Impaired respiratory function-inability to consume food     Signs and symptoms:  as evidenced by NPO status due to medical condition    Nutrition Assessment:  · Subjective Assessment: Pt seend due to possible vent. Pt is presently in OR for CABG/Valve. · Wound Type: Surgical Wound  · Current Nutrition Therapies:  · Oral Diet Orders: NPO   · Anthropometric Measures:  · Ht: 5' 7\" (170.2 cm)   · Current Body Wt: 230 lb (104.3 kg)  · Ideal Body Wt: 148 lb (67.1 kg), % Ideal Body 155%  · BMI Classification: BMI 35.0 - 39.9 Obese Class II  · Comparative Standards (Estimated Nutrition Needs):  · Estimated Daily Total Kcal: (15-18 kcal/kg)  0598-0155  kcal  · Estimated Daily Protein (g): 80-95 g    Estimated Intake vs Estimated Needs: Intake Less Than Needs    Nutrition Risk Level: High    Nutrition Interventions:   Start Tube Feeding  Education not appropriate at this time    Nutrition Evaluation:   · Evaluation: Goals set   · Goals: meet 75% or more of pt's needs    · Monitoring: NPO Status    See Adult Nutrition Doc Flowsheet for more detail.      Electronically signed by Jose Cornejo RD, LD on 3/16/18 at 12:10 PM    Contact Number: [phone]
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
## 2018-03-16 NOTE — ANESTHESIA PROCEDURE NOTES
Central Venous Line:    A central venous line was placed using ultrasound guidance and surface landmarks, in the OR for the following indication(s): central venous access and CVP monitoring. 3/16/2018 7:40 AM3/16/2018 7:45 AM    Sterility preparation included the following: hand hygiene performed prior to procedure, maximum sterile barriers used and sterile technique used to drape from head to toe. The patient was placed in Trendelenburg position. The right internal jugular vein was prepped. The site was prepped with Chloraprep. A 9 Fr (size), 10 (length), introducer single lumen was placed. During the procedure, the following specific steps were taken: target vein identified, needle advanced into vein and blood aspirated and guidewire advanced into vein. Intravenous verification was obtained by venous blood return and LUTHER. Post insertion care included: all ports aspirated, all ports flushed easily, guidewire removed intact, Biopatch applied, line sutured in place and dressing applied. During the procedure the patient experienced: patient tolerated procedure well with no complications. Insertion site scrubbed per usage guidelines?: Yes  Skin prep agent dried for 3 minutes prior to procedure?:yes  Anesthesia type: generalA(n) oximetric, 8 (size) Pulmonary Artery Catheter (PAC) was placed through the Introducer CVL in the right internal jugular vein. The PAC placement was confirmed by pressure tracing changes and LUTHER and secured at 45 cm (depth). The patient experienced the following events during the procedure: patient tolerated procedure well with no complications. PA Cath placed?: Yes  Staffing  Anesthesiologist: Rosa Elena Villagomez  Performed: Anesthesiologist   Preanesthetic Checklist  Completed: patient identified, site marked, surgical consent, pre-op evaluation, timeout performed, IV checked, risks and benefits discussed, monitors and equipment checked, anesthesia consent given, oxygen available and patient being monitored

## 2018-03-16 NOTE — ANESTHESIA PRE PROCEDURE
Mean gredient is 37 mmHg. Cannot exclude bicuspid aortic valve. Moderate to severe aortic valve regurgitation. Past Surgical History:        Procedure Laterality Date    CARDIAC CATHETERIZATION Left 03/15/2018    Right radial/ [facility] Wilfred/DR Lutz-severe mitral valve disease and multivessel coronary artery disease    VASECTOMY         Social History:    Social History   Substance Use Topics    Smoking status: Current Some Day Smoker     Packs/day: 1.00     Years: 40.00     Types: Cigarettes, Pipe, Cigars    Smokeless tobacco: Never Used      Comment: 1 pack every two weeks x2 years    Alcohol use Yes                                Ready to quit: Not Answered  Counseling given: Not Answered      Vital Signs (Current):   Vitals:    03/15/18 1731 03/15/18 1804 03/16/18 0409 03/16/18 0554   BP: (!) 146/72 (!) 146/72 (!) 148/79    Pulse: 78 87 72    Resp: 20 20 18    Temp: 98 °F (36.7 °C) 98 °F (36.7 °C) 97.9 °F (36.6 °C)    TempSrc: Oral Oral Oral    SpO2: 96% 97% 97%    Weight:    230 lb 13.2 oz (104.7 kg)   Height:                                                  BP Readings from Last 3 Encounters:   03/16/18 (!) 148/79   03/15/18 (!) 153/78   03/01/18 (!) 143/91       NPO Status:                                                                                 BMI:   Wt Readings from Last 3 Encounters:   03/16/18 230 lb 13.2 oz (104.7 kg)   03/15/18 230 lb (104.3 kg)   03/01/18 230 lb 12.8 oz (104.7 kg)     Body mass index is 36.15 kg/m².     CBC:   Lab Results   Component Value Date    WBC 10.3 03/16/2018    RBC 4.08 03/16/2018    HGB 12.4 03/16/2018    HCT 40.1 03/16/2018    MCV 98.3 03/16/2018    RDW 13.6 03/16/2018     03/16/2018       CMP:   Lab Results   Component Value Date     03/16/2018    K 4.0 03/16/2018     03/16/2018    CO2 24 03/16/2018    BUN 12 03/16/2018    CREATININE 0.57 03/16/2018    GFRAA >60 03/16/2018    LABGLOM >60 03/16/2018    GLUCOSE 100 03/16/2018

## 2018-03-16 NOTE — ANESTHESIA PROCEDURE NOTES
Central Venous Line:    A central venous line was placed using surface landmarks, in the OR for the following indication(s): central venous access and CVP monitoring. 3/16/2018 7:17 AM3/16/2018 7:23 AM    Sterility preparation included the following: hand hygiene performed prior to procedure, maximum sterile barriers used and sterile technique used to drape from head to toe. The patient was placed in Trendelenburg position. The right subclavian vein was prepped. The site was prepped with Chloraprep. Catheter size: 8. 16 (length), double lumen was placed. During the procedure, the following specific steps were taken: target vein identified, needle advanced into vein and blood aspirated and guidewire advanced into vein. Intravenous verification was obtained by venous blood return and LUTHER. Post insertion care included: all ports aspirated, all ports flushed easily, guidewire removed intact, Biopatch applied, line sutured in place and dressing applied. During the procedure the patient experienced: patient tolerated procedure well with no complications.       Insertion site scrubbed per usage guidelines?: Yes  Skin prep agent dried for 3 minutes prior to procedure?:yes  Anesthesia type: general..No  Staffing  Anesthesiologist: Anupam Butcher  Performed: Anesthesiologist   Preanesthetic Checklist  Completed: patient identified, site marked, surgical consent, pre-op evaluation, timeout performed, IV checked, risks and benefits discussed, monitors and equipment checked, anesthesia consent given, oxygen available and patient being monitored

## 2018-03-16 NOTE — PROGRESS NOTES
Pt complained to the nurse that \"No one is answering my questions. \" Nurse spoke with pt and let him know that his questions needed to be answered before surgery. Pt would not give any examples of questions and just kept saying that he would not ask any because \"what's the point? \" Pt was educated on the importance of getting his questions answered but pt was insistent on not wanting to ask any questions or have anyone ask for him. Surgery team notified.

## 2018-03-16 NOTE — ANESTHESIA PROCEDURE NOTES
Arterial Line:    An arterial line was placed using surface landmarks, in the OR for the following indication(s): continuous blood pressure monitoring and blood sampling needed. A 20 gauge (size), 1 and 3/4 inch (length), Arrow (type) catheter was placed, Seldinger technique used, into the right radial artery, secured by tape and Tegaderm. Anesthesia type: Local  Local infiltration: Injection    Events:  patient tolerated procedure well with no complications. Additional notes: The art line was placed prior to the induction.   3/16/2018 7:06 AM3/16/2018 7:08 AM  Anesthesiologist: Rosa Elena Villagomez  Performed: Anesthesiologist   Preanesthetic Checklist  Completed: patient identified, site marked, surgical consent, pre-op evaluation, timeout performed, IV checked, risks and benefits discussed, monitors and equipment checked, anesthesia consent given, oxygen available and patient being monitored

## 2018-03-17 ENCOUNTER — APPOINTMENT (OUTPATIENT)
Dept: GENERAL RADIOLOGY | Age: 53
DRG: 160 | End: 2018-03-17
Attending: THORACIC SURGERY (CARDIOTHORACIC VASCULAR SURGERY)
Payer: MEDICARE

## 2018-03-17 LAB
ABO/RH: NORMAL
ABSOLUTE EOS #: 0 K/UL (ref 0–0.4)
ABSOLUTE IMMATURE GRANULOCYTE: 0 K/UL (ref 0–0.3)
ABSOLUTE LYMPH #: 2.09 K/UL (ref 1–4.8)
ABSOLUTE MONO #: 1.61 K/UL (ref 0.1–0.8)
ALLEN TEST: ABNORMAL
ALLEN TEST: ABNORMAL
ANION GAP SERPL CALCULATED.3IONS-SCNC: 15 MMOL/L (ref 9–17)
ANION GAP SERPL CALCULATED.3IONS-SCNC: 8 MMOL/L (ref 9–17)
ANTIBODY SCREEN: NEGATIVE
ARM BAND NUMBER: NORMAL
BASOPHILS # BLD: 0 % (ref 0–2)
BASOPHILS ABSOLUTE: 0 K/UL (ref 0–0.2)
BLD PROD TYP BPU: NORMAL
BLD PROD TYP BPU: NORMAL
BUN BLDV-MCNC: 11 MG/DL (ref 6–20)
BUN BLDV-MCNC: 13 MG/DL (ref 6–20)
BUN/CREAT BLD: ABNORMAL (ref 9–20)
BUN/CREAT BLD: ABNORMAL (ref 9–20)
CALCIUM IONIZED: 1.05 MMOL/L (ref 1.13–1.33)
CALCIUM SERPL-MCNC: 7.2 MG/DL (ref 8.6–10.4)
CALCIUM SERPL-MCNC: 8.1 MG/DL (ref 8.6–10.4)
CHLORIDE BLD-SCNC: 105 MMOL/L (ref 98–107)
CHLORIDE BLD-SCNC: 106 MMOL/L (ref 98–107)
CO2: 21 MMOL/L (ref 20–31)
CO2: 22 MMOL/L (ref 20–31)
CREAT SERPL-MCNC: 0.65 MG/DL (ref 0.7–1.2)
CREAT SERPL-MCNC: 0.81 MG/DL (ref 0.7–1.2)
CROSSMATCH RESULT: NORMAL
CROSSMATCH RESULT: NORMAL
CULTURE: NO GROWTH
CULTURE: NORMAL
DIFFERENTIAL TYPE: ABNORMAL
DISPENSE STATUS BLOOD BANK: NORMAL
DISPENSE STATUS BLOOD BANK: NORMAL
EKG ATRIAL RATE: 74 BPM
EKG ATRIAL RATE: 79 BPM
EKG P AXIS: -22 DEGREES
EKG P-R INTERVAL: 194 MS
EKG Q-T INTERVAL: 454 MS
EKG Q-T INTERVAL: 462 MS
EKG QRS DURATION: 146 MS
EKG QRS DURATION: 150 MS
EKG QTC CALCULATION (BAZETT): 512 MS
EKG QTC CALCULATION (BAZETT): 520 MS
EKG R AXIS: -33 DEGREES
EKG R AXIS: -58 DEGREES
EKG T AXIS: 108 DEGREES
EKG T AXIS: 52 DEGREES
EKG VENTRICULAR RATE: 74 BPM
EKG VENTRICULAR RATE: 79 BPM
EOSINOPHILS RELATIVE PERCENT: 0 % (ref 1–4)
EXPIRATION DATE: NORMAL
FIO2: 50
FIO2: 50
GFR AFRICAN AMERICAN: >60 ML/MIN
GFR AFRICAN AMERICAN: >60 ML/MIN
GFR NON-AFRICAN AMERICAN: >60 ML/MIN
GFR NON-AFRICAN AMERICAN: >60 ML/MIN
GFR SERPL CREATININE-BSD FRML MDRD: ABNORMAL ML/MIN/{1.73_M2}
GLUCOSE BLD-MCNC: 123 MG/DL (ref 75–110)
GLUCOSE BLD-MCNC: 124 MG/DL (ref 75–110)
GLUCOSE BLD-MCNC: 133 MG/DL (ref 75–110)
GLUCOSE BLD-MCNC: 134 MG/DL (ref 75–110)
GLUCOSE BLD-MCNC: 134 MG/DL (ref 75–110)
GLUCOSE BLD-MCNC: 135 MG/DL (ref 75–110)
GLUCOSE BLD-MCNC: 136 MG/DL (ref 75–110)
GLUCOSE BLD-MCNC: 137 MG/DL (ref 70–99)
GLUCOSE BLD-MCNC: 144 MG/DL (ref 75–110)
GLUCOSE BLD-MCNC: 145 MG/DL (ref 70–99)
GLUCOSE BLD-MCNC: 149 MG/DL (ref 75–110)
GLUCOSE BLD-MCNC: 151 MG/DL (ref 74–100)
GLUCOSE BLD-MCNC: 152 MG/DL (ref 74–100)
GLUCOSE BLD-MCNC: 161 MG/DL (ref 75–110)
GLUCOSE BLD-MCNC: 162 MG/DL (ref 75–110)
HBCO, MIXED, EXTENDED: 0.7 % (ref 0–5)
HCT VFR BLD CALC: 27.4 % (ref 40.7–50.3)
HCT VFR BLD CALC: 31.6 % (ref 40.7–50.3)
HEMOGLOBIN, MIXED, EXTENDED: 8.6 G/DL (ref 12–18)
HEMOGLOBIN: 8.5 G/DL (ref 13–17)
HEMOGLOBIN: 9.8 G/DL (ref 13–17)
IMMATURE GRANULOCYTES: 0 %
LYMPHOCYTES # BLD: 13 % (ref 24–44)
Lab: NORMAL
MAGNESIUM: 2 MG/DL (ref 1.6–2.6)
MAGNESIUM: 2.2 MG/DL (ref 1.6–2.6)
MCH RBC QN AUTO: 29.9 PG (ref 25.2–33.5)
MCH RBC QN AUTO: 30.5 PG (ref 25.2–33.5)
MCHC RBC AUTO-ENTMCNC: 31 G/DL (ref 28.4–34.8)
MCHC RBC AUTO-ENTMCNC: 31 G/DL (ref 28.4–34.8)
MCV RBC AUTO: 96.5 FL (ref 82.6–102.9)
MCV RBC AUTO: 98.4 FL (ref 82.6–102.9)
METHB, MIXED, EXTENDED: 1.2 % (ref 0–1.5)
MODE: ABNORMAL
MODE: ABNORMAL
MONOCYTES # BLD: 10 % (ref 1–7)
MORPHOLOGY: NORMAL
NEGATIVE BASE EXCESS, ART: 2 (ref 0–2)
NEGATIVE BASE EXCESS, ART: 3 (ref 0–2)
NRBC AUTOMATED: 0 PER 100 WBC
NRBC AUTOMATED: 0 PER 100 WBC
O2 CONTENT, MIXED, EXTENDED: 7 VOL % (ref 12–20)
O2 DEVICE/FLOW/%: ABNORMAL
O2 DEVICE/FLOW/%: ABNORMAL
O2 SAT, MIXED, EXTENDED: 57.5 % (ref 60–80)
OXYGEN STATUS: ABNORMAL
PATIENT TEMP: 37.8
PATIENT TEMP: ABNORMAL
PDW BLD-RTO: 13.8 % (ref 11.8–14.4)
PDW BLD-RTO: 14 % (ref 11.8–14.4)
PLATELET # BLD: 120 K/UL (ref 138–453)
PLATELET # BLD: 144 K/UL (ref 138–453)
PLATELET ESTIMATE: ABNORMAL
PMV BLD AUTO: 11.4 FL (ref 8.1–13.5)
PMV BLD AUTO: 11.5 FL (ref 8.1–13.5)
POC HCO3: 20.8 MMOL/L (ref 21–28)
POC HCO3: 21.8 MMOL/L (ref 21–28)
POC O2 SATURATION: 93 % (ref 94–98)
POC O2 SATURATION: 97 % (ref 94–98)
POC PCO2 TEMP: 34 MM HG
POC PCO2 TEMP: ABNORMAL MM HG
POC PCO2: 32.9 MM HG (ref 35–48)
POC PCO2: 33.3 MM HG (ref 35–48)
POC PH TEMP: 7.42
POC PH TEMP: ABNORMAL
POC PH: 7.4 (ref 7.35–7.45)
POC PH: 7.43 (ref 7.35–7.45)
POC PO2 TEMP: 68 MM HG
POC PO2 TEMP: ABNORMAL MM HG
POC PO2: 64.6 MM HG (ref 83–108)
POC PO2: 86.3 MM HG (ref 83–108)
POSITIVE BASE EXCESS, ART: ABNORMAL (ref 0–3)
POSITIVE BASE EXCESS, ART: ABNORMAL (ref 0–3)
POTASSIUM SERPL-SCNC: 4.4 MMOL/L (ref 3.7–5.3)
POTASSIUM SERPL-SCNC: 4.7 MMOL/L (ref 3.7–5.3)
POTASSIUM SERPL-SCNC: 4.7 MMOL/L (ref 3.7–5.3)
RBC # BLD: 2.84 M/UL (ref 4.21–5.77)
RBC # BLD: 3.21 M/UL (ref 4.21–5.77)
RBC # BLD: ABNORMAL 10*6/UL
SAMPLE SITE: ABNORMAL
SAMPLE SITE: ABNORMAL
SEG NEUTROPHILS: 77 % (ref 36–66)
SEGMENTED NEUTROPHILS ABSOLUTE COUNT: 12.4 K/UL (ref 1.8–7.7)
SODIUM BLD-SCNC: 136 MMOL/L (ref 135–144)
SODIUM BLD-SCNC: 141 MMOL/L (ref 135–144)
SPECIMEN DESCRIPTION: NORMAL
STATUS: NORMAL
TCO2 (CALC), ART: 22 MMOL/L (ref 22–29)
TCO2 (CALC), ART: 23 MMOL/L (ref 22–29)
TRANSFUSION STATUS: NORMAL
TRANSFUSION STATUS: NORMAL
UNIT DIVISION: 0
UNIT DIVISION: 0
UNIT NUMBER: NORMAL
UNIT NUMBER: NORMAL
WBC # BLD: 16.1 K/UL (ref 3.5–11.3)
WBC # BLD: 19.5 K/UL (ref 3.5–11.3)
WBC # BLD: ABNORMAL 10*3/UL

## 2018-03-17 PROCEDURE — 94762 N-INVAS EAR/PLS OXIMTRY CONT: CPT

## 2018-03-17 PROCEDURE — G8987 SELF CARE CURRENT STATUS: HCPCS

## 2018-03-17 PROCEDURE — 97166 OT EVAL MOD COMPLEX 45 MIN: CPT

## 2018-03-17 PROCEDURE — G8988 SELF CARE GOAL STATUS: HCPCS

## 2018-03-17 PROCEDURE — 71045 X-RAY EXAM CHEST 1 VIEW: CPT

## 2018-03-17 PROCEDURE — 6360000002 HC RX W HCPCS: Performed by: NURSE PRACTITIONER

## 2018-03-17 PROCEDURE — 6370000000 HC RX 637 (ALT 250 FOR IP): Performed by: NURSE PRACTITIONER

## 2018-03-17 PROCEDURE — 82947 ASSAY GLUCOSE BLOOD QUANT: CPT

## 2018-03-17 PROCEDURE — 97535 SELF CARE MNGMENT TRAINING: CPT

## 2018-03-17 PROCEDURE — 93005 ELECTROCARDIOGRAM TRACING: CPT

## 2018-03-17 PROCEDURE — 85027 COMPLETE CBC AUTOMATED: CPT

## 2018-03-17 PROCEDURE — 82803 BLOOD GASES ANY COMBINATION: CPT

## 2018-03-17 PROCEDURE — 6360000002 HC RX W HCPCS

## 2018-03-17 PROCEDURE — 2140000001 HC CVICU INTERMEDIATE R&B

## 2018-03-17 PROCEDURE — 82375 ASSAY CARBOXYHB QUANT: CPT

## 2018-03-17 PROCEDURE — 94640 AIRWAY INHALATION TREATMENT: CPT

## 2018-03-17 PROCEDURE — 84132 ASSAY OF SERUM POTASSIUM: CPT

## 2018-03-17 PROCEDURE — 36415 COLL VENOUS BLD VENIPUNCTURE: CPT

## 2018-03-17 PROCEDURE — S0028 INJECTION, FAMOTIDINE, 20 MG: HCPCS | Performed by: THORACIC SURGERY (CARDIOTHORACIC VASCULAR SURGERY)

## 2018-03-17 PROCEDURE — 6360000002 HC RX W HCPCS: Performed by: THORACIC SURGERY (CARDIOTHORACIC VASCULAR SURGERY)

## 2018-03-17 PROCEDURE — 99024 POSTOP FOLLOW-UP VISIT: CPT | Performed by: NURSE PRACTITIONER

## 2018-03-17 PROCEDURE — 80048 BASIC METABOLIC PNL TOTAL CA: CPT

## 2018-03-17 PROCEDURE — 83050 HGB METHEMOGLOBIN QUAN: CPT

## 2018-03-17 PROCEDURE — 83735 ASSAY OF MAGNESIUM: CPT

## 2018-03-17 PROCEDURE — 2580000003 HC RX 258: Performed by: THORACIC SURGERY (CARDIOTHORACIC VASCULAR SURGERY)

## 2018-03-17 PROCEDURE — 82330 ASSAY OF CALCIUM: CPT

## 2018-03-17 PROCEDURE — 6370000000 HC RX 637 (ALT 250 FOR IP): Performed by: THORACIC SURGERY (CARDIOTHORACIC VASCULAR SURGERY)

## 2018-03-17 PROCEDURE — 2500000003 HC RX 250 WO HCPCS: Performed by: THORACIC SURGERY (CARDIOTHORACIC VASCULAR SURGERY)

## 2018-03-17 PROCEDURE — 85025 COMPLETE CBC W/AUTO DIFF WBC: CPT

## 2018-03-17 PROCEDURE — 82805 BLOOD GASES W/O2 SATURATION: CPT

## 2018-03-17 RX ORDER — FUROSEMIDE 10 MG/ML
INJECTION INTRAMUSCULAR; INTRAVENOUS
Status: COMPLETED
Start: 2018-03-17 | End: 2018-03-17

## 2018-03-17 RX ORDER — LISINOPRIL 5 MG/1
5 TABLET ORAL DAILY
Status: DISCONTINUED | OUTPATIENT
Start: 2018-03-17 | End: 2018-03-19

## 2018-03-17 RX ORDER — DOCUSATE SODIUM 100 MG/1
100 CAPSULE, LIQUID FILLED ORAL 2 TIMES DAILY
Status: DISCONTINUED | OUTPATIENT
Start: 2018-03-17 | End: 2018-03-20 | Stop reason: HOSPADM

## 2018-03-17 RX ORDER — BISACODYL 10 MG
10 SUPPOSITORY, RECTAL RECTAL DAILY PRN
Status: DISCONTINUED | OUTPATIENT
Start: 2018-03-17 | End: 2018-03-20 | Stop reason: HOSPADM

## 2018-03-17 RX ORDER — PREGABALIN 75 MG/1
150 CAPSULE ORAL 3 TIMES DAILY
Status: DISCONTINUED | OUTPATIENT
Start: 2018-03-17 | End: 2018-03-20 | Stop reason: HOSPADM

## 2018-03-17 RX ORDER — AMITRIPTYLINE HYDROCHLORIDE 50 MG/1
50 TABLET, FILM COATED ORAL DAILY
Status: DISCONTINUED | OUTPATIENT
Start: 2018-03-17 | End: 2018-03-20 | Stop reason: HOSPADM

## 2018-03-17 RX ORDER — POLYETHYLENE GLYCOL 3350 17 G/17G
17 POWDER, FOR SOLUTION ORAL DAILY
Status: DISCONTINUED | OUTPATIENT
Start: 2018-03-17 | End: 2018-03-20 | Stop reason: HOSPADM

## 2018-03-17 RX ORDER — FUROSEMIDE 10 MG/ML
40 INJECTION INTRAMUSCULAR; INTRAVENOUS ONCE
Status: COMPLETED | OUTPATIENT
Start: 2018-03-17 | End: 2018-03-17

## 2018-03-17 RX ORDER — FAMOTIDINE 20 MG/1
20 TABLET, FILM COATED ORAL 2 TIMES DAILY
Status: DISCONTINUED | OUTPATIENT
Start: 2018-03-17 | End: 2018-03-20 | Stop reason: HOSPADM

## 2018-03-17 RX ORDER — MONTELUKAST SODIUM 10 MG/1
10 TABLET ORAL DAILY
Status: DISCONTINUED | OUTPATIENT
Start: 2018-03-17 | End: 2018-03-20 | Stop reason: HOSPADM

## 2018-03-17 RX ORDER — CYCLOBENZAPRINE HCL 10 MG
10 TABLET ORAL 3 TIMES DAILY PRN
Status: DISCONTINUED | OUTPATIENT
Start: 2018-03-17 | End: 2018-03-20 | Stop reason: HOSPADM

## 2018-03-17 RX ORDER — ALBUTEROL SULFATE 2.5 MG/3ML
2.5 SOLUTION RESPIRATORY (INHALATION)
Status: DISCONTINUED | OUTPATIENT
Start: 2018-03-17 | End: 2018-03-20 | Stop reason: HOSPADM

## 2018-03-17 RX ADMIN — FAMOTIDINE 20 MG: 10 INJECTION INTRAVENOUS at 08:46

## 2018-03-17 RX ADMIN — AMIODARONE HYDROCHLORIDE 200 MG: 200 TABLET ORAL at 20:15

## 2018-03-17 RX ADMIN — HYDRALAZINE HYDROCHLORIDE 5 MG: 20 INJECTION INTRAMUSCULAR; INTRAVENOUS at 17:21

## 2018-03-17 RX ADMIN — FENTANYL CITRATE 50 MCG: 50 INJECTION INTRAMUSCULAR; INTRAVENOUS at 20:12

## 2018-03-17 RX ADMIN — OXYCODONE HYDROCHLORIDE AND ACETAMINOPHEN 2 TABLET: 5; 325 TABLET ORAL at 16:27

## 2018-03-17 RX ADMIN — KETOROLAC TROMETHAMINE 15 MG: 15 INJECTION, SOLUTION INTRAMUSCULAR; INTRAVENOUS at 03:38

## 2018-03-17 RX ADMIN — OXYCODONE HYDROCHLORIDE AND ACETAMINOPHEN 2 TABLET: 5; 325 TABLET ORAL at 22:40

## 2018-03-17 RX ADMIN — CALCIUM CHLORIDE 1 G: 100 INJECTION, SOLUTION INTRAVENOUS; INTRAVENTRICULAR at 18:29

## 2018-03-17 RX ADMIN — CHLORHEXIDINE GLUCONATE 15 ML: 1.2 RINSE ORAL at 20:16

## 2018-03-17 RX ADMIN — DOCUSATE SODIUM 100 MG: 100 CAPSULE ORAL at 20:15

## 2018-03-17 RX ADMIN — Medication 81 MG: at 16:26

## 2018-03-17 RX ADMIN — DOCUSATE SODIUM 100 MG: 100 CAPSULE ORAL at 16:26

## 2018-03-17 RX ADMIN — FUROSEMIDE 40 MG: 10 INJECTION, SOLUTION INTRAMUSCULAR; INTRAVENOUS at 06:04

## 2018-03-17 RX ADMIN — OXYCODONE HYDROCHLORIDE AND ACETAMINOPHEN 2 TABLET: 5; 325 TABLET ORAL at 12:22

## 2018-03-17 RX ADMIN — CHLORHEXIDINE GLUCONATE 15 ML: 1.2 RINSE ORAL at 16:26

## 2018-03-17 RX ADMIN — FENTANYL CITRATE 50 MCG: 50 INJECTION INTRAMUSCULAR; INTRAVENOUS at 05:08

## 2018-03-17 RX ADMIN — FENTANYL CITRATE 50 MCG: 50 INJECTION INTRAMUSCULAR; INTRAVENOUS at 02:40

## 2018-03-17 RX ADMIN — PROPOFOL 30 MCG/KG/MIN: 10 INJECTION, EMULSION INTRAVENOUS at 01:02

## 2018-03-17 RX ADMIN — MULTIPLE VITAMINS W/ MINERALS TAB 1 TABLET: TAB at 16:27

## 2018-03-17 RX ADMIN — ENOXAPARIN SODIUM 30 MG: 30 INJECTION SUBCUTANEOUS at 20:16

## 2018-03-17 RX ADMIN — VASOPRESSIN 2 UNITS/HR: 20 INJECTION INTRAVENOUS at 02:00

## 2018-03-17 RX ADMIN — SIMVASTATIN 20 MG: 20 TABLET, FILM COATED ORAL at 20:15

## 2018-03-17 RX ADMIN — POTASSIUM CHLORIDE 20 MEQ: 400 INJECTION, SOLUTION INTRAVENOUS at 00:37

## 2018-03-17 RX ADMIN — OXYCODONE HYDROCHLORIDE AND ACETAMINOPHEN 2 TABLET: 5; 325 TABLET ORAL at 03:43

## 2018-03-17 RX ADMIN — PREGABALIN 150 MG: 75 CAPSULE ORAL at 16:27

## 2018-03-17 RX ADMIN — FUROSEMIDE 40 MG: 10 INJECTION INTRAMUSCULAR; INTRAVENOUS at 06:04

## 2018-03-17 RX ADMIN — KETOROLAC TROMETHAMINE 15 MG: 15 INJECTION, SOLUTION INTRAMUSCULAR; INTRAVENOUS at 08:46

## 2018-03-17 RX ADMIN — PREGABALIN 150 MG: 75 CAPSULE ORAL at 20:15

## 2018-03-17 RX ADMIN — CLOPIDOGREL 75 MG: 75 TABLET, FILM COATED ORAL at 16:27

## 2018-03-17 RX ADMIN — Medication 1500 MG: at 08:47

## 2018-03-17 RX ADMIN — AMITRIPTYLINE HYDROCHLORIDE 50 MG: 50 TABLET, FILM COATED ORAL at 22:40

## 2018-03-17 RX ADMIN — FAMOTIDINE 20 MG: 20 TABLET, FILM COATED ORAL at 20:17

## 2018-03-17 RX ADMIN — MOMETASONE FUROATE AND FORMOTEROL FUMARATE DIHYDRATE 2 PUFF: 200; 5 AEROSOL RESPIRATORY (INHALATION) at 19:36

## 2018-03-17 RX ADMIN — KETOROLAC TROMETHAMINE 15 MG: 15 INJECTION, SOLUTION INTRAMUSCULAR; INTRAVENOUS at 20:15

## 2018-03-17 RX ADMIN — FENTANYL CITRATE 50 MCG: 50 INJECTION INTRAMUSCULAR; INTRAVENOUS at 00:36

## 2018-03-17 RX ADMIN — LISINOPRIL 5 MG: 5 TABLET ORAL at 20:15

## 2018-03-17 ASSESSMENT — PAIN SCALES - GENERAL
PAINLEVEL_OUTOF10: 5
PAINLEVEL_OUTOF10: 4
PAINLEVEL_OUTOF10: 8
PAINLEVEL_OUTOF10: 5
PAINLEVEL_OUTOF10: 5

## 2018-03-17 ASSESSMENT — PULMONARY FUNCTION TESTS
PIF_VALUE: 28
PIF_VALUE: 12
PIF_VALUE: 12
PIF_VALUE: 39
PIF_VALUE: 12

## 2018-03-17 ASSESSMENT — PAIN DESCRIPTION - LOCATION
LOCATION: CHEST;BACK
LOCATION: BACK;CHEST;GENERALIZED
LOCATION: BACK;CHEST

## 2018-03-17 ASSESSMENT — PAIN DESCRIPTION - PAIN TYPE
TYPE: ACUTE PAIN;CHRONIC PAIN;SURGICAL PAIN
TYPE: SURGICAL PAIN
TYPE: SURGICAL PAIN

## 2018-03-17 ASSESSMENT — PAIN DESCRIPTION - ORIENTATION
ORIENTATION: MID

## 2018-03-17 ASSESSMENT — PAIN DESCRIPTION - DESCRIPTORS
DESCRIPTORS: ACHING;DISCOMFORT
DESCRIPTORS: ACHING

## 2018-03-17 NOTE — PROGRESS NOTES
Order obtained for extubation. SpO2 of 95 on 40% FiO2. Patient extubated and placed on 6 liters/min via nasal cannula. Post extubation SpO2 is 98% with HR  68 bpm and RR 18 breaths/min. Patient had mild cough that was productive of brown sputum. Extubation Well tolerated by patient. .   Breath Sounds: Clear apices, diminished bases    Laura Penn SR   7:06 AM

## 2018-03-17 NOTE — PROGRESS NOTES
Received a return call from Lila Smith. Updated on pt status and 18 beat run of vtach. Mentioned that ionized calcium will be replaced and an EKG was completed. Lila Smith stated she would callback with orders from Dr. Dariela Manzo regarding heart block and elevated BP and HR.

## 2018-03-17 NOTE — PROGRESS NOTES
Mobility  Functional - Mobility Device: Standard Walker  Activity: Other (bed to chair)  Assist Level: Minimal assistance  Functional Mobility Comments: verbal cues for hand placement on SW during functional transfers   ADL  Feeding: Independent (for beverage management )  Grooming: Contact guard assistance;Setup  UE Bathing: Moderate assistance;Setup  LE Bathing: Maximum assistance;Setup  UE Dressing: Moderate assistance;Setup (to tie and adjust gown)  LE Dressing: Maximum assistance;Setup (to don/ doff socks and roll up ARABELLA hose)  Toileting: Maximum assistance;Setup (Catheter in place, pt reports difficulty completing pericare on days with increased pain from RA)  Additional Comments: Pt supine in bed on arrival. Pt assisted to complete bed mobility and sit at EOB. Pt completed scooting to EOB. Pt reports dizziness at baseline, but reported dizziness did not worsen with positional change. Pt completed sit > stand transfer and functional mobility to chairside. Pt required verbal cues for hand placement/ body placement on RW. Pt educated in sternal precautions and proper pursed lipped breathing tech during functional activiites. Pt remained in chair, call light in reach and RN notified on therapist exit. Wife present for evaluation with pt permission. Tone RUE  RUE Tone: Normotonic  Tone LUE  LUE Tone: Normotonic  Coordination  Movements Are Fluid And Coordinated: No  Coordination and Movement description: Right UE;Left UE;Gross motor impairments;Decreased speed (pt reports deficits from RA)     Bed mobility  Rolling to Right: Minimal assistance  Supine to Sit: Minimal assistance (for trunk progression)  Scooting: Contact guard assistance  Comment: Pt up in chair on exit  Transfers  Stand Step Transfers: Minimal assistance  Sit to stand: Contact guard assistance  Stand to sit: Contact guard assistance  Transfer Comments: verbal cues for hand placement on RW.  Min A to assist with walker managment/ body placement Cognition  Overall Cognitive Status: WFL  Perception  Overall Perceptual Status: WFL     Sensation  Overall Sensation Status: WFL      LUE AROM (degrees)  LUE AROM : WFL  LUE General AROM: not formally tested d/t sternal precautions, joint pain  RUE AROM (degrees)  RUE AROM : WFL  RUE General AROM: not formally tested d/t sternal precautions, joint pain  LUE Strength  Gross LUE Strength: WFL  LUE Strength Comment: not formally tested d/t sternal precautions, joint pain  RUE Strength  Gross RUE Strength: WFL  RUE Strength Comment: not formally tested d/t sternal precautions, joint pain     Assessment   Performance deficits / Impairments: Decreased functional mobility ; Decreased ADL status; Decreased endurance;Decreased high-level IADLs  Assessment: Pt would benefit from continued IP OT to address maximal deficits in ADL/ functional activities, endurance and functional transfers/ mobility following CABG. Pt required use of Rw on this date and max A to complete ADL activities.   Treatment Diagnosis: CABG x2  Prognosis: Good  Decision Making: Medium Complexity  Patient Education: OT POC, discharge planning, safety, AD for home use  REQUIRES OT FOLLOW UP: Yes  Activity Tolerance  Activity Tolerance: Patient limited by pain  Activity Tolerance: Pt reports pain from hernia and RA on this date  Safety Devices  Safety Devices in place: Yes  Type of devices: Left in chair;Nurse notified;Call light within reach;Gait belt;Patient at risk for falls  Restraints  Initially in place: No  OT Equipment Recommendations  Equipment Needed: No        Discharge Recommendations:     Plan   Plan  Times per week: 4-5x/wk  Current Treatment Recommendations: Functional Mobility Training, Endurance Training, Safety Education & Training, Self-Care / ADL, Patient/Caregiver Education & Training, Equipment Evaluation, Education, & procurement, Home Management Training    G-Code  OT G-codes  Functional Assessment Tool Used: [facility]  Score: 16/24  Functional Limitation: Self care  Self Care Current Status (): At least 40 percent but less than 60 percent impaired, limited or restricted  Self Care Goal Status (): At least 20 percent but less than 40 percent impaired, limited or restricted    AM-PAC Score  AM-PAC Inpatient Daily Activity Raw Score: 16  AM-PAC Inpatient ADL T-Scale Score : 35.96  ADL Inpatient CMS 0-100% Score: 53.32  ADL Inpatient CMS G-Code Modifier : CK    Goals  Short term goals  Time Frame for Short term goals: by discharge, pt will  Short term goal 1: demo I in UE ADL activites   Short term goal 2: demo MI/min A for LE ADL activites with AD as needed  Short term goal 3: demo understanding and I use of EC/WS, fall prevention, sternal precautions and proper pursed lipped breathing tech during functional activities   Short term goal 4: demo understanding and I use of safe transfer tech during functional activites with LRD  Short term goal 5: demo increased activity tolerance of 20+ min to assist with ADL/ functional activities   Short term goal 6: demo I in functional transfers/ mobility to assist with ADL/ functional activities   Patient Goals   Patient goals : to get moving     Therapy Time   Individual Concurrent Group Co-treatment   Time In 1507         Time Out 1554         Minutes 47          See above for LOF. RN reports patient is medically stable for therapy treatment this date. Chart reviewed prior to treatment and patient is agreeable for therapy. All lines intact and patient positioned comfortably at end of treatment. All patient needs addressed prior to ending therapy session.        Chyrl Heimlich, OTR/L

## 2018-03-17 NOTE — PROGRESS NOTES
(ZOCOR) tablet 20 mg, 20 mg, Oral, Nightly, Alistair Barry MD    aspirin EC tablet 81 mg, 81 mg, Oral, Daily, Alistair Barry MD, 81 mg at 03/16/18 1524    clopidogrel (PLAVIX) tablet 75 mg, 75 mg, Oral, Daily, Alistair Barry MD    glucose (GLUTOSE) 40 % oral gel 15 g, 15 g, Oral, PRN, Alistair Barry MD    dextrose 50 % solution 12.5 g, 12.5 g, Intravenous, PRN, Alistair Barry MD    glucagon (rDNA) injection 1 mg, 1 mg, Intramuscular, PRN, Alistair Barry MD    dextrose 5 % solution, 100 mL/hr, Intravenous, PRN, Alistair Barry MD    vancomycin (VANCOCIN) 1500 mg in dextrose 5 % 250 mL IVPB, 1,500 mg, Intravenous, Q12H, Alistair Barry MD, 1,500 mg at 03/17/18 0847    oxyCODONE-acetaminophen (PERCOCET) 5-325 MG per tablet 2 tablet, 2 tablet, Oral, Q4H PRN, Alistair Barry MD, 2 tablet at 03/17/18 0343    ketorolac (TORADOL) injection 15 mg, 15 mg, Intravenous, Q6H, Alistair Barry MD, 15 mg at 03/17/18 0846    famotidine (PEPCID) injection 20 mg, 20 mg, Intravenous, BID, Alistair Baryr MD, 20 mg at 03/17/18 0846    insulin lispro (HUMALOG) injection vial 0-6 Units, 0-6 Units, Subcutaneous, TID WC, Alistair Barry MD    insulin lispro (HUMALOG) injection vial 0-3 Units, 0-3 Units, Subcutaneous, Nightly, Alistair Barry MD    insulin regular (HUMULIN R;NOVOLIN R) 100 Units in sodium chloride 0.9 % 100 mL infusion, 0.5 Units/hr, Intravenous, Continuous, Alistair Barry MD, Last Rate: 79.6 mL/hr at 03/17/18 0840, 0.76 Units/kg/hr at 03/17/18 0840    vasopressin 20 Units in dextrose 5 % 100 mL infusion, 2 Units/hr, Intravenous, Continuous, Alistair Barry MD, Last Rate: 5 mL/hr at 03/17/18 0900, 1 Units/hr at 03/17/18 0900    niCARdipine (CARDENE) 20 mg in 0.9 % sodium chloride 200 mL infusion, 5 mg/hr, Intravenous, Continuous, Alistair Barry MD, Stopped at 03/16/18 2005    Data:  CBC:   Recent Labs      03/16/18   0538  03/16/18   1241  03/16/18   1438  03/17/18   0517   WBC  10.3

## 2018-03-17 NOTE — PROGRESS NOTES
03/17/18 0550 03/17/18 0558   Vent Information   Vent Mode SIMV/PRVC PS   Vt Ordered 670 mL --    Rate Set 18 bmp --    Pressure Support 14 cmH20 6 cmH20   FiO2  50 % 50 %   Vent Patient Data   Vt Exhaled 814 mL 579 mL   Spont VT --  585 mL   Rate Measured 18 br/min 25 br/min   Minute Volume 12.3 Liters 12.7 Liters   Additional Respiratory  Assessments   Pulse 81 81   SpO2 100 % 100 %   End Tidal CO2 34 (%) 36 (%)     Called to bedside as pt is waking up from sedation. Received on full support PRVC/SIMV mode, awake, no respiratory distress noted. Changed to PS/CPAP wean in anticipation of extubation. Pt mara well. INDIA Yip at bedside.

## 2018-03-17 NOTE — PLAN OF CARE
Problem: MECHANICAL VENTILATION  Goal: Patient will maintain patent airway  Outcome: Completed Date Met: 03/17/18    Goal: Oral health is maintained or improved  Outcome: Completed Date Met: 03/17/18    Goal: ET tube will be managed safely  Outcome: Completed Date Met: 03/17/18    Goal: Ability to express needs and understand communication  Outcome: Completed Date Met: 03/17/18    Goal: Mobility/activity is maintained at optimum level for patient  Outcome: Completed Date Met: 03/17/18      Problem: Respiratory  Intervention: Respiratory assessment  BRONCHOSPASM/BRONCHOCONSTRICTION     [x]         IMPROVE AERATION/BREATH SOUNDS  [x]   ADMINISTER BRONCHODILATOR THERAPY AS APPROPRIATE  [x]   ASSESS BREATH SOUNDS  [x]   IMPLEMENT AEROSOL/MDI PROTOCOL  [x]   PATIENT EDUCATION AS NEEDED

## 2018-03-17 NOTE — PROGRESS NOTES
Contacted Kris Grullon NP regarding pt's elevated BP and reaction to hydralazine. Also mentioned that pt is still in a heart block. Awaiting return call.

## 2018-03-17 NOTE — OP NOTE
[address]                   [facility] [address]                                 OPERATIVE REPORT    PATIENT NAME: Jackelyn Spicer                      :        1965  MED REC NO:   [id number]                             ROOM:       1010  ACCOUNT NO:   [de-identified]                           ADMIT DATE: 03/15/2018  PROVIDER:     Bon Kaur    DATE OF PROCEDURE:  2018    PREOPERATIVE DIAGNOSES:  Coronary artery disease, aortic stenosis, and  ascending aortic aneurysm. POSTOPERATIVE DIAGNOSES:  Coronary artery disease, aortic stenosis, and  ascending aortic aneurysm. OPERATIONS PERFORMED:  Double coronary bypass grafting using left RUBEN in  sequence to diagonal and LAD, aortic valve replacement with 23-mm  Natalie-Max Magna bioprosthesis, resection of the ascending aortic  aneurysm with replacement using 28-mm Dacron graft under cardiopulmonary  bypass, transesophageal echocardiography by Anesthesia. SURGEON:  Bon Kaur MD    ASSISTANT:  MILAGROS Francois    ANESTHESIA:  General endotracheal.    FINDINGS:  The patient had been symptomatic for the last 1 year with  progressive shortness of breath and chest pain. Workup revealed severe  aortic stenosis with moderate insufficiency. Coronary angiography revealed  severe disease in the LAD and diagonal.  The patient also had enlarged  aorta on echo, and the CTA revealed a 5 cm ascending aortic aneurysm. The  patient was referred for surgery. The RUBEN was of excellent quality and so  was the diagonal and LAD. Diagonal was somewhat on the smaller side. The  LAD also had some disease with plaques throughout its course. The aortic  valve was bicuspid with calcification which was burrowing into the mitral  valve as well as into the septum. The aneurysm started at the sinotubular  junction and went up to distal aorta. All this segment was removed and  replaced.   The postoperative transesophageal echocardiography revealed  well-seated bioprosthesis with a mean gradient of 10 mmHg. Ventricular  function was normal.  Heart was very large and hypertrophic. PROCEDURE IN DETAIL:  After satisfactory general endotracheal anesthesia,  the patient was positioned. Skin was prepped from chin to ankles and  draped in the usual fashion. Median sternotomy was carried out, and left  RUBEN was harvested in a skeletonized fashion. The patient was heparinized,  and distal end of the RUBEN was divided. The pericardium was opened and  suspended. Pursestring suture placed in the transverse arch for aortic  cannulation and in the right atrial appendage. Arch was cannulated  followed by the single venous cannula into the right atrium. The patient  was placed on cardiopulmonary bypass, and temperature was gradually  decreased to 32 degrees centigrade. CO2 was flooded into the pericardium. Left ventricular vent was placed through right superior pulmonary vein. A  retrograde cardioplegia catheter was placed through the right atrium into  the coronary sinus. Aorta was cross-clamped, and cold blood cardioplegia  was given in the retrograde fashion to achieve cardiac arrest.  Later on,  when the aorta was opened, the right coronary artery was directly  cannulated to give cardioplegia completely in both sides. The ascending  aortic aneurysm was resected. The right coronary was then cannulated with  a soft-tip cannula. Cardioplegia was then given intermittently throughout  the cross-clamp time. The diagonal was then opened. A side arteriotomy  was made on the RUBEN, and side-to-side anastomosis was created with 8-0  Prolene continuous suture technique. The end of the RUBEN was implanted to  the LAD using 8-0 Prolene continuous suture technique. Once this was done,  the aortic valve was excised, calcium was debrided.   Annulus was measured,  and it admitted a 23-mm Natalie-Max

## 2018-03-17 NOTE — PROGRESS NOTES
Pt had an 18 beat run of vtach. Pt asymptomatic and converted back to sinus. BMP, CBC, and magnesium labs ordered.

## 2018-03-18 ENCOUNTER — APPOINTMENT (OUTPATIENT)
Dept: GENERAL RADIOLOGY | Age: 53
DRG: 160 | End: 2018-03-18
Attending: THORACIC SURGERY (CARDIOTHORACIC VASCULAR SURGERY)
Payer: MEDICARE

## 2018-03-18 LAB
ABSOLUTE EOS #: 0 K/UL (ref 0–0.4)
ABSOLUTE IMMATURE GRANULOCYTE: 0 K/UL (ref 0–0.3)
ABSOLUTE LYMPH #: 2.43 K/UL (ref 1–4.8)
ABSOLUTE MONO #: 1.37 K/UL (ref 0.1–0.8)
ANION GAP SERPL CALCULATED.3IONS-SCNC: 11 MMOL/L (ref 9–17)
BASOPHILS # BLD: 0 % (ref 0–2)
BASOPHILS ABSOLUTE: 0 K/UL (ref 0–0.2)
BUN BLDV-MCNC: 14 MG/DL (ref 6–20)
BUN/CREAT BLD: ABNORMAL (ref 9–20)
CALCIUM SERPL-MCNC: 8.1 MG/DL (ref 8.6–10.4)
CHLORIDE BLD-SCNC: 103 MMOL/L (ref 98–107)
CO2: 22 MMOL/L (ref 20–31)
CREAT SERPL-MCNC: 0.79 MG/DL (ref 0.7–1.2)
DIFFERENTIAL TYPE: ABNORMAL
EOSINOPHILS RELATIVE PERCENT: 0 % (ref 1–4)
GFR AFRICAN AMERICAN: >60 ML/MIN
GFR NON-AFRICAN AMERICAN: >60 ML/MIN
GFR SERPL CREATININE-BSD FRML MDRD: ABNORMAL ML/MIN/{1.73_M2}
GFR SERPL CREATININE-BSD FRML MDRD: ABNORMAL ML/MIN/{1.73_M2}
GLUCOSE BLD-MCNC: 105 MG/DL (ref 75–110)
GLUCOSE BLD-MCNC: 107 MG/DL (ref 70–99)
GLUCOSE BLD-MCNC: 111 MG/DL (ref 75–110)
GLUCOSE BLD-MCNC: 116 MG/DL (ref 75–110)
GLUCOSE BLD-MCNC: 118 MG/DL (ref 75–110)
GLUCOSE BLD-MCNC: 122 MG/DL (ref 75–110)
GLUCOSE BLD-MCNC: 125 MG/DL (ref 75–110)
GLUCOSE BLD-MCNC: 135 MG/DL (ref 75–110)
GLUCOSE BLD-MCNC: 141 MG/DL (ref 75–110)
GLUCOSE BLD-MCNC: 160 MG/DL (ref 75–110)
GLUCOSE BLD-MCNC: 165 MG/DL (ref 75–110)
HCT VFR BLD CALC: 32 % (ref 40.7–50.3)
HEMOGLOBIN: 10 G/DL (ref 13–17)
IMMATURE GRANULOCYTES: 0 %
INR BLD: 1
LYMPHOCYTES # BLD: 16 % (ref 24–44)
MAGNESIUM: 2.3 MG/DL (ref 1.6–2.6)
MCH RBC QN AUTO: 30 PG (ref 25.2–33.5)
MCHC RBC AUTO-ENTMCNC: 31.3 G/DL (ref 28.4–34.8)
MCV RBC AUTO: 96.1 FL (ref 82.6–102.9)
MONOCYTES # BLD: 9 % (ref 1–7)
MORPHOLOGY: NORMAL
NRBC AUTOMATED: 0 PER 100 WBC
PDW BLD-RTO: 13.9 % (ref 11.8–14.4)
PLATELET # BLD: 102 K/UL (ref 138–453)
PLATELET ESTIMATE: ABNORMAL
PMV BLD AUTO: 11.2 FL (ref 8.1–13.5)
POTASSIUM SERPL-SCNC: 4.1 MMOL/L (ref 3.7–5.3)
PROTHROMBIN TIME: 10.6 SEC (ref 9–12)
RBC # BLD: 3.33 M/UL (ref 4.21–5.77)
RBC # BLD: ABNORMAL 10*6/UL
SEG NEUTROPHILS: 75 % (ref 36–66)
SEGMENTED NEUTROPHILS ABSOLUTE COUNT: 11.4 K/UL (ref 1.8–7.7)
SODIUM BLD-SCNC: 136 MMOL/L (ref 135–144)
WBC # BLD: 15.2 K/UL (ref 3.5–11.3)
WBC # BLD: ABNORMAL 10*3/UL

## 2018-03-18 PROCEDURE — 2580000003 HC RX 258: Performed by: NURSE PRACTITIONER

## 2018-03-18 PROCEDURE — 6360000002 HC RX W HCPCS: Performed by: NURSE PRACTITIONER

## 2018-03-18 PROCEDURE — 6360000002 HC RX W HCPCS: Performed by: THORACIC SURGERY (CARDIOTHORACIC VASCULAR SURGERY)

## 2018-03-18 PROCEDURE — 83735 ASSAY OF MAGNESIUM: CPT

## 2018-03-18 PROCEDURE — 99024 POSTOP FOLLOW-UP VISIT: CPT | Performed by: NURSE PRACTITIONER

## 2018-03-18 PROCEDURE — 6370000000 HC RX 637 (ALT 250 FOR IP): Performed by: STUDENT IN AN ORGANIZED HEALTH CARE EDUCATION/TRAINING PROGRAM

## 2018-03-18 PROCEDURE — 94640 AIRWAY INHALATION TREATMENT: CPT

## 2018-03-18 PROCEDURE — 71045 X-RAY EXAM CHEST 1 VIEW: CPT

## 2018-03-18 PROCEDURE — 6370000000 HC RX 637 (ALT 250 FOR IP): Performed by: THORACIC SURGERY (CARDIOTHORACIC VASCULAR SURGERY)

## 2018-03-18 PROCEDURE — 85610 PROTHROMBIN TIME: CPT

## 2018-03-18 PROCEDURE — 97162 PT EVAL MOD COMPLEX 30 MIN: CPT

## 2018-03-18 PROCEDURE — 85025 COMPLETE CBC W/AUTO DIFF WBC: CPT

## 2018-03-18 PROCEDURE — 36415 COLL VENOUS BLD VENIPUNCTURE: CPT

## 2018-03-18 PROCEDURE — 82947 ASSAY GLUCOSE BLOOD QUANT: CPT

## 2018-03-18 PROCEDURE — 2140000001 HC CVICU INTERMEDIATE R&B

## 2018-03-18 PROCEDURE — 6370000000 HC RX 637 (ALT 250 FOR IP)

## 2018-03-18 PROCEDURE — 97116 GAIT TRAINING THERAPY: CPT

## 2018-03-18 PROCEDURE — 94762 N-INVAS EAR/PLS OXIMTRY CONT: CPT

## 2018-03-18 PROCEDURE — 80048 BASIC METABOLIC PNL TOTAL CA: CPT

## 2018-03-18 PROCEDURE — 6370000000 HC RX 637 (ALT 250 FOR IP): Performed by: NURSE PRACTITIONER

## 2018-03-18 PROCEDURE — 6360000002 HC RX W HCPCS

## 2018-03-18 RX ORDER — POTASSIUM CHLORIDE 20 MEQ/1
TABLET, EXTENDED RELEASE ORAL
Status: COMPLETED
Start: 2018-03-18 | End: 2018-03-18

## 2018-03-18 RX ORDER — POTASSIUM CHLORIDE 20 MEQ/1
20 TABLET, EXTENDED RELEASE ORAL ONCE
Status: COMPLETED | OUTPATIENT
Start: 2018-03-18 | End: 2018-03-18

## 2018-03-18 RX ORDER — MAGNESIUM HYDROXIDE/ALUMINUM HYDROXICE/SIMETHICONE 120; 1200; 1200 MG/30ML; MG/30ML; MG/30ML
20 SUSPENSION ORAL EVERY 6 HOURS PRN
Status: DISCONTINUED | OUTPATIENT
Start: 2018-03-18 | End: 2018-03-20 | Stop reason: HOSPADM

## 2018-03-18 RX ORDER — FUROSEMIDE 10 MG/ML
40 INJECTION INTRAMUSCULAR; INTRAVENOUS ONCE
Status: COMPLETED | OUTPATIENT
Start: 2018-03-18 | End: 2018-03-19

## 2018-03-18 RX ORDER — FUROSEMIDE 10 MG/ML
INJECTION INTRAMUSCULAR; INTRAVENOUS
Status: COMPLETED
Start: 2018-03-18 | End: 2018-03-18

## 2018-03-18 RX ORDER — WARFARIN SODIUM 5 MG/1
5 TABLET ORAL DAILY
Status: DISCONTINUED | OUTPATIENT
Start: 2018-03-18 | End: 2018-03-20 | Stop reason: HOSPADM

## 2018-03-18 RX ORDER — FUROSEMIDE 10 MG/ML
40 INJECTION INTRAMUSCULAR; INTRAVENOUS ONCE
Status: COMPLETED | OUTPATIENT
Start: 2018-03-18 | End: 2018-03-18

## 2018-03-18 RX ADMIN — ALUMINUM HYDROXIDE, MAGNESIUM HYDROXIDE, AND SIMETHICONE 20 ML: 200; 200; 20 SUSPENSION ORAL at 13:55

## 2018-03-18 RX ADMIN — KETOROLAC TROMETHAMINE 15 MG: 15 INJECTION, SOLUTION INTRAMUSCULAR; INTRAVENOUS at 07:55

## 2018-03-18 RX ADMIN — POTASSIUM CHLORIDE 20 MEQ: 20 TABLET, EXTENDED RELEASE ORAL at 12:15

## 2018-03-18 RX ADMIN — Medication 81 MG: at 07:58

## 2018-03-18 RX ADMIN — POTASSIUM CHLORIDE 20 MEQ: 20 TABLET, EXTENDED RELEASE ORAL at 19:58

## 2018-03-18 RX ADMIN — PREGABALIN 150 MG: 75 CAPSULE ORAL at 13:23

## 2018-03-18 RX ADMIN — DOCUSATE SODIUM 100 MG: 100 CAPSULE ORAL at 07:59

## 2018-03-18 RX ADMIN — CLOPIDOGREL 75 MG: 75 TABLET, FILM COATED ORAL at 08:04

## 2018-03-18 RX ADMIN — PREGABALIN 150 MG: 75 CAPSULE ORAL at 08:02

## 2018-03-18 RX ADMIN — PREGABALIN 150 MG: 75 CAPSULE ORAL at 19:57

## 2018-03-18 RX ADMIN — FENTANYL CITRATE 50 MCG: 50 INJECTION INTRAMUSCULAR; INTRAVENOUS at 04:28

## 2018-03-18 RX ADMIN — FUROSEMIDE 40 MG: 10 INJECTION INTRAMUSCULAR; INTRAVENOUS at 12:05

## 2018-03-18 RX ADMIN — OXYCODONE HYDROCHLORIDE AND ACETAMINOPHEN 2 TABLET: 5; 325 TABLET ORAL at 18:15

## 2018-03-18 RX ADMIN — ENOXAPARIN SODIUM 30 MG: 30 INJECTION SUBCUTANEOUS at 19:58

## 2018-03-18 RX ADMIN — KETOROLAC TROMETHAMINE 15 MG: 15 INJECTION, SOLUTION INTRAMUSCULAR; INTRAVENOUS at 13:22

## 2018-03-18 RX ADMIN — WARFARIN SODIUM 5 MG: 5 TABLET ORAL at 18:15

## 2018-03-18 RX ADMIN — SODIUM CHLORIDE: 9 INJECTION, SOLUTION INTRAVENOUS at 07:23

## 2018-03-18 RX ADMIN — KETOROLAC TROMETHAMINE 15 MG: 15 INJECTION, SOLUTION INTRAMUSCULAR; INTRAVENOUS at 01:24

## 2018-03-18 RX ADMIN — SIMVASTATIN 20 MG: 20 TABLET, FILM COATED ORAL at 19:58

## 2018-03-18 RX ADMIN — OXYCODONE HYDROCHLORIDE AND ACETAMINOPHEN 2 TABLET: 5; 325 TABLET ORAL at 22:02

## 2018-03-18 RX ADMIN — METOPROLOL TARTRATE 12.5 MG: 25 TABLET ORAL at 11:58

## 2018-03-18 RX ADMIN — POLYETHYLENE GLYCOL 3350 17 G: 17 POWDER, FOR SOLUTION ORAL at 08:01

## 2018-03-18 RX ADMIN — METOPROLOL TARTRATE 12.5 MG: 25 TABLET ORAL at 19:57

## 2018-03-18 RX ADMIN — INSULIN LISPRO 1 UNITS: 100 INJECTION, SOLUTION INTRAVENOUS; SUBCUTANEOUS at 11:49

## 2018-03-18 RX ADMIN — ENOXAPARIN SODIUM 30 MG: 30 INJECTION SUBCUTANEOUS at 08:00

## 2018-03-18 RX ADMIN — DOCUSATE SODIUM 100 MG: 100 CAPSULE ORAL at 19:58

## 2018-03-18 RX ADMIN — OXYCODONE HYDROCHLORIDE AND ACETAMINOPHEN 2 TABLET: 5; 325 TABLET ORAL at 06:50

## 2018-03-18 RX ADMIN — FENTANYL CITRATE 50 MCG: 50 INJECTION INTRAMUSCULAR; INTRAVENOUS at 20:36

## 2018-03-18 RX ADMIN — MONTELUKAST SODIUM 10 MG: 10 TABLET, FILM COATED ORAL at 08:02

## 2018-03-18 RX ADMIN — MOMETASONE FUROATE AND FORMOTEROL FUMARATE DIHYDRATE 2 PUFF: 200; 5 AEROSOL RESPIRATORY (INHALATION) at 09:59

## 2018-03-18 RX ADMIN — FAMOTIDINE 20 MG: 20 TABLET, FILM COATED ORAL at 19:57

## 2018-03-18 RX ADMIN — CYCLOBENZAPRINE 10 MG: 10 TABLET, FILM COATED ORAL at 08:05

## 2018-03-18 RX ADMIN — MULTIPLE VITAMINS W/ MINERALS TAB 1 TABLET: TAB at 07:58

## 2018-03-18 RX ADMIN — LISINOPRIL 5 MG: 5 TABLET ORAL at 08:02

## 2018-03-18 RX ADMIN — FAMOTIDINE 20 MG: 20 TABLET, FILM COATED ORAL at 08:01

## 2018-03-18 RX ADMIN — CHLORHEXIDINE GLUCONATE 15 ML: 1.2 RINSE ORAL at 19:58

## 2018-03-18 RX ADMIN — MOMETASONE FUROATE AND FORMOTEROL FUMARATE DIHYDRATE 2 PUFF: 200; 5 AEROSOL RESPIRATORY (INHALATION) at 19:35

## 2018-03-18 RX ADMIN — FUROSEMIDE 40 MG: 10 INJECTION, SOLUTION INTRAMUSCULAR; INTRAVENOUS at 12:05

## 2018-03-18 RX ADMIN — OXYCODONE HYDROCHLORIDE AND ACETAMINOPHEN 2 TABLET: 5; 325 TABLET ORAL at 13:32

## 2018-03-18 ASSESSMENT — PAIN SCALES - GENERAL
PAINLEVEL_OUTOF10: 8
PAINLEVEL_OUTOF10: 9
PAINLEVEL_OUTOF10: 8
PAINLEVEL_OUTOF10: 8
PAINLEVEL_OUTOF10: 6
PAINLEVEL_OUTOF10: 6
PAINLEVEL_OUTOF10: 8
PAINLEVEL_OUTOF10: 5
PAINLEVEL_OUTOF10: 6
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
## 2018-03-18 NOTE — PROGRESS NOTES
0.62 Units/hr at 03/18/18 0600    vasopressin 20 Units in dextrose 5 % 100 mL infusion, 2 Units/hr, Intravenous, Continuous, Devika Ochoa MD, Stopped at 03/17/18 0915    niCARdipine (CARDENE) 20 mg in 0.9 % sodium chloride 200 mL infusion, 5 mg/hr, Intravenous, Continuous, Devika Ochoa MD, Stopped at 03/16/18 2005    Data:  CBC:   Recent Labs      03/17/18   0517  03/17/18   1617  03/18/18   0412   WBC  16.1*  19.5*  15.2*   HGB  8.5*  9.8*  10.0*   HCT  27.4*  31.6*  32.0*   MCV  96.5  98.4  96.1   PLT  120*  144  102*     BMP:   Recent Labs      03/17/18   0517  03/17/18   1617  03/18/18   0412   NA  136  141  136   K  4.7  4.4  4.1   CL  106  105  103   CO2  22  21  22   BUN  11  13  14   CREATININE  0.65*  0.81  0.79   MG  2.0  2.2  2.3     Accucheck Glucoses:   Recent Labs      03/17/18   2215  03/18/18   0026  03/18/18   0129  03/18/18   0412  03/18/18   0611   POCGLU  160*  135*  116*  105  122*     Cardiac Enzymes: No results for input(s): CKTOTAL, CKMB, CKMBINDEX, TROPONINI in the last 72 hours. PTT/PT/INR:   Recent Labs      03/16/18   0252  03/16/18   1241   PROTIME  9.7  12.5*   INR  0.9  1.2     Recent Labs      03/15/18   1606  03/16/18   0252  03/16/18   1241   APTT  31.4*  29.1  28.0         Assessment & Plan:   . S/p CABG x 2, AVR with bioprosthetic, ascending aortic aneurysm replacement              POD 2            D/C PO  Amio  due to block , hold BB   Started ACE last night for BP control, tolerated well              Plan to start coumadin               ABLA - stable, continue to monitor     DVT ppx: Lovenox & SCD's while stationary  Patient is on ASA, Statin therapy per protocol   Plan for discharge home with home care when appropriate     The above recommendations including medications and orders were discussed and agreed upon with  the attending on service for the cardiothoracic surgery group today.      Laim Yoder CNP  Phone: [phone]

## 2018-03-18 NOTE — CONSULTS
[facility] Cardiology Cardiology    Consult / H&P               Today's Date: 3/18/2018  Patient Name: Lorenza Sweeney  Date of admission: 3/15/2018  2:39 PM  Patient's age: 52 y.o., 1965  Admission Dx: CAD, multiple vessel [I25.10]  CAD, multiple vessel [I25.10]    Reason for Consult:  Cardiac evaluation    Requesting Physician: Jeffry Pollard MD    CHIEF COMPLAINT:  Fatigue/sob    History Obtained From:  patient    HISTORY OF PRESENT ILLNESS:      The patient is a 52 y.o.  male who is admitted to the hospital for aortic valve replacement and coronary artery bypass grafting after elective cardiac catheterization was done at [place]. Patient complained of progressively worsening chest pain and shortness of breath over the last 6 months. Cardiac cath showed multivessel disease and 2d echo showed EF > 60%. Patient had CABG x 2, AVR with bioprosthetic, ascending aortic aneurysm replacement on 3/16/18. He is post op day #2. Patient had a 18 beat run for NSVT yesterday. Ionized calcium was replaced. Past Medical History:   has a past medical history of Arthritis; Cerebral artery occlusion with cerebral infarction [facility]); CHF (congestive heart failure) ([facility] [address] COPD (chronic obstructive pulmonary disease) ([facility] [address] and H/O echocardiogram.    Past Surgical History:   has a past surgical history that includes Cardiac catheterization (Left, 03/15/2018) and Vasectomy. Home Medications:    Prior to Admission medications    Medication Sig Start Date End Date Taking?  Authorizing Provider   SYMBICORT 160-4.5 MCG/ACT AERO inhale 2 puffs by mouth once a day 2/14/18  Yes Historical Provider, MD   furosemide (LASIX) 20 MG tablet Take 1 tablet by mouth daily 3/5/18   Evelyn Michelle MD   albuterol (PROVENTIL) (2.5 MG/3ML) 0.083% nebulizer solution inhale contents of 1 vial in nebulizer every 4 hours if needed for wheezing 12/6/17   Historical Provider, MD   amitriptyline (ELAVIL) 50 MG tablet Take 50 mg by mouth daily tablet 650 mg, 650 mg, Oral, Q4H PRN  fentaNYL (SUBLIMAZE) injection 50 mcg, 50 mcg, Intravenous, Q1H PRN  diphenhydrAMINE (BENADRYL) tablet 25 mg, 25 mg, Oral, Nightly PRN  magnesium hydroxide (MILK OF MAGNESIA) 400 MG/5ML suspension 30 mL, 30 mL, Oral, Daily PRN  ondansetron (ZOFRAN) injection 4 mg, 4 mg, Intravenous, Q8H PRN  chlorhexidine (PERIDEX) 0.12 % solution 15 mL, 15 mL, Mouth/Throat, BID  hydrALAZINE (APRESOLINE) injection 5 mg, 5 mg, Intravenous, Q5 Min PRN  therapeutic multivitamin-minerals 1 tablet, 1 tablet, Oral, Daily with breakfast  simvastatin (ZOCOR) tablet 20 mg, 20 mg, Oral, Nightly  aspirin EC tablet 81 mg, 81 mg, Oral, Daily  clopidogrel (PLAVIX) tablet 75 mg, 75 mg, Oral, Daily  glucose (GLUTOSE) 40 % oral gel 15 g, 15 g, Oral, PRN  dextrose 50 % solution 12.5 g, 12.5 g, Intravenous, PRN  glucagon (rDNA) injection 1 mg, 1 mg, Intramuscular, PRN  dextrose 5 % solution, 100 mL/hr, Intravenous, PRN  oxyCODONE-acetaminophen (PERCOCET) 5-325 MG per tablet 2 tablet, 2 tablet, Oral, Q4H PRN  ketorolac (TORADOL) injection 15 mg, 15 mg, Intravenous, Q6H  insulin lispro (HUMALOG) injection vial 0-6 Units, 0-6 Units, Subcutaneous, TID WC  insulin lispro (HUMALOG) injection vial 0-3 Units, 0-3 Units, Subcutaneous, Nightly  vasopressin 20 Units in dextrose 5 % 100 mL infusion, 2 Units/hr, Intravenous, Continuous  niCARdipine (CARDENE) 20 mg in 0.9 % sodium chloride 200 mL infusion, 5 mg/hr, Intravenous, Continuous    Allergies:  Patient has no known allergies. Social History:   reports that he has been smoking Cigarettes, Pipe, and Cigars. He has a 40.00 pack-year smoking history. He has never used smokeless tobacco. He reports that he drinks alcohol. He reports that he does not use drugs. Family History: family history is not on file. REVIEW OF SYSTEMS:    · Constitutional: no fevers or chills  · Eyes: No visual changes or diplopia. No scleral icterus.   · ENT: No Headaches  · Cardiovascular: No chest pain or palpitations  · Respiratory: No previous pulmonary problems, No cough  · Gastrointestinal: No abdominal pain. No change in bowel or bladder habits. · Genitourinary: No dysuria, trouble voiding, or hematuria. · Musculoskeletal:  No gait disturbance, No weakness or joint complaints. · Integumentary: No rash or pruritis. · Neurological: No headache, diplopia, change in muscle strength. · Psychiatric: No anxiety, or depression. · Endocrine: No temperature intolerance. No excessive thirst, fluid intake, or urination. No tremor. · Hematologic/Lymphatic: No abnormal bruising or bleeding, blood clots or swollen lymph nodes. · Allergic/Immunologic: No nasal congestion or hives. PHYSICAL EXAM:      /63   Pulse 81   Temp 97.6 °F (36.4 °C) (Oral)   Resp 18   Ht 5' 7\" (1.702 m)   Wt 244 lb 11.2 oz (111 kg)   SpO2 100%   BMI 38.33 kg/m²    Constitutional and General Appearance: alert, cooperative, no distress and appears stated age  HEENT: PERRL, Normal oral mucosa  Respiratory:  · Normal excursion and expansion without use of accessory muscles  · Resp Auscultation: Good respiratory effort. No for increased work of breathing. Diminished breath sounds at bases. Cardiovascular:  · Heart tones are crisp and normal. regular S1 and S2.  · Peripheral pulses are symmetrical and full   Abdomen:   · No masses or tenderness, +abdominal hernia  · Bowel sounds present  Extremities:  ·  No Cyanosis or Clubbing  ·  + bilateral lower extremity edema  ·  Skin: Warm and dry  Neurological:  · Alert and oriented.   · Moves all extremities well  · No abnormalities of mood, affect, memory, mentation, or behavior are noted    DATA:    Diagnostics:    EKG: 3/17/18: RBBB, t wave inversion lateral leads    ECHO 2/23/18: normal LV, EF 55-60%, moderate AS, severe concentric hypertrophy    Cardiac Angiography:   3/15/18  Procedure Summary      Severe single vessel disease involving vessel    Bicuspid aortic valve    Severe calcific aortic stenosis    Angina, class III ([facility])       RECOMMENDATIONS:  1. CAD s/p CABG x2 - Continue management per CT surgery. Continue ASA, plavix and zocor. ACE-I started overnight for BP control. Recommend starting BB. 2. Severe AS s/p bioprosthetic AVR - On coumadin   3. S/p ascending aortic aneurysm replacement      Discussed with patient and Nurse. Electronically signed by Diana Spurling, MD on 3/18/2018 at 10:37 AM    [facility]      [phone]    Attending Cardiologist Addendum: I have reviewed and performed the history, physical, subjective, objective, assessment, and plan with the resident/fellow and agree with the note. I performed the history and physical personally. I have made changes to the note above as needed. Thank you for allowing me to participate in the care of this patient, please do not hesitate to call if you have any questions.     Jasmina Sánchez MD MSc. [facility]  [phone]

## 2018-03-19 ENCOUNTER — APPOINTMENT (OUTPATIENT)
Dept: GENERAL RADIOLOGY | Age: 53
DRG: 160 | End: 2018-03-19
Attending: THORACIC SURGERY (CARDIOTHORACIC VASCULAR SURGERY)
Payer: MEDICARE

## 2018-03-19 LAB
ABSOLUTE EOS #: 0.16 K/UL (ref 0–0.44)
ABSOLUTE IMMATURE GRANULOCYTE: 0.16 K/UL (ref 0–0.3)
ABSOLUTE LYMPH #: 1.57 K/UL (ref 1.1–3.7)
ABSOLUTE MONO #: 1.88 K/UL (ref 0.1–1.2)
ANION GAP SERPL CALCULATED.3IONS-SCNC: 8 MMOL/L (ref 9–17)
BASOPHILS # BLD: 0 % (ref 0–2)
BASOPHILS ABSOLUTE: 0 K/UL (ref 0–0.2)
BUN BLDV-MCNC: 13 MG/DL (ref 6–20)
BUN/CREAT BLD: ABNORMAL (ref 9–20)
CALCIUM SERPL-MCNC: 8.8 MG/DL (ref 8.6–10.4)
CHLORIDE BLD-SCNC: 102 MMOL/L (ref 98–107)
CO2: 25 MMOL/L (ref 20–31)
CREAT SERPL-MCNC: 0.63 MG/DL (ref 0.7–1.2)
DIFFERENTIAL TYPE: ABNORMAL
EOSINOPHILS RELATIVE PERCENT: 1 % (ref 1–4)
GFR AFRICAN AMERICAN: >60 ML/MIN
GFR NON-AFRICAN AMERICAN: >60 ML/MIN
GFR SERPL CREATININE-BSD FRML MDRD: ABNORMAL ML/MIN/{1.73_M2}
GFR SERPL CREATININE-BSD FRML MDRD: ABNORMAL ML/MIN/{1.73_M2}
GLUCOSE BLD-MCNC: 103 MG/DL (ref 75–110)
GLUCOSE BLD-MCNC: 113 MG/DL (ref 75–110)
GLUCOSE BLD-MCNC: 127 MG/DL (ref 70–99)
GLUCOSE BLD-MCNC: 145 MG/DL (ref 75–110)
GLUCOSE BLD-MCNC: 146 MG/DL (ref 75–110)
HCT VFR BLD CALC: 29.6 % (ref 40.7–50.3)
HEMOGLOBIN: 8.8 G/DL (ref 13–17)
IMMATURE GRANULOCYTES: 1 %
INR BLD: 0.9
LYMPHOCYTES # BLD: 10 % (ref 24–43)
MAGNESIUM: 2.3 MG/DL (ref 1.6–2.6)
MCH RBC QN AUTO: 29.9 PG (ref 25.2–33.5)
MCHC RBC AUTO-ENTMCNC: 29.7 G/DL (ref 28.4–34.8)
MCV RBC AUTO: 100.7 FL (ref 82.6–102.9)
MONOCYTES # BLD: 12 % (ref 3–12)
MORPHOLOGY: NORMAL
NRBC AUTOMATED: 0 PER 100 WBC
PDW BLD-RTO: 13.6 % (ref 11.8–14.4)
PLATELET # BLD: 182 K/UL (ref 138–453)
PLATELET ESTIMATE: ABNORMAL
PMV BLD AUTO: 11.1 FL (ref 8.1–13.5)
POTASSIUM SERPL-SCNC: 4.8 MMOL/L (ref 3.7–5.3)
PROTHROMBIN TIME: 10.2 SEC (ref 9–12)
RBC # BLD: 2.94 M/UL (ref 4.21–5.77)
RBC # BLD: ABNORMAL 10*6/UL
SEG NEUTROPHILS: 76 % (ref 36–65)
SEGMENTED NEUTROPHILS ABSOLUTE COUNT: 11.93 K/UL (ref 1.5–8.1)
SODIUM BLD-SCNC: 135 MMOL/L (ref 135–144)
WBC # BLD: 15.7 K/UL (ref 3.5–11.3)
WBC # BLD: ABNORMAL 10*3/UL

## 2018-03-19 PROCEDURE — 97535 SELF CARE MNGMENT TRAINING: CPT

## 2018-03-19 PROCEDURE — 36415 COLL VENOUS BLD VENIPUNCTURE: CPT

## 2018-03-19 PROCEDURE — 85610 PROTHROMBIN TIME: CPT

## 2018-03-19 PROCEDURE — 6360000002 HC RX W HCPCS: Performed by: THORACIC SURGERY (CARDIOTHORACIC VASCULAR SURGERY)

## 2018-03-19 PROCEDURE — 6370000000 HC RX 637 (ALT 250 FOR IP): Performed by: NURSE PRACTITIONER

## 2018-03-19 PROCEDURE — 85025 COMPLETE CBC W/AUTO DIFF WBC: CPT

## 2018-03-19 PROCEDURE — 99024 POSTOP FOLLOW-UP VISIT: CPT | Performed by: PHYSICIAN ASSISTANT

## 2018-03-19 PROCEDURE — 6370000000 HC RX 637 (ALT 250 FOR IP): Performed by: PHYSICIAN ASSISTANT

## 2018-03-19 PROCEDURE — 94762 N-INVAS EAR/PLS OXIMTRY CONT: CPT

## 2018-03-19 PROCEDURE — 82947 ASSAY GLUCOSE BLOOD QUANT: CPT

## 2018-03-19 PROCEDURE — 99406 BEHAV CHNG SMOKING 3-10 MIN: CPT

## 2018-03-19 PROCEDURE — 6360000002 HC RX W HCPCS: Performed by: NURSE PRACTITIONER

## 2018-03-19 PROCEDURE — 71045 X-RAY EXAM CHEST 1 VIEW: CPT

## 2018-03-19 PROCEDURE — 83735 ASSAY OF MAGNESIUM: CPT

## 2018-03-19 PROCEDURE — 2140000001 HC CVICU INTERMEDIATE R&B

## 2018-03-19 PROCEDURE — 94640 AIRWAY INHALATION TREATMENT: CPT

## 2018-03-19 PROCEDURE — 97116 GAIT TRAINING THERAPY: CPT

## 2018-03-19 PROCEDURE — 6370000000 HC RX 637 (ALT 250 FOR IP): Performed by: INTERNAL MEDICINE

## 2018-03-19 PROCEDURE — 80048 BASIC METABOLIC PNL TOTAL CA: CPT

## 2018-03-19 PROCEDURE — 97110 THERAPEUTIC EXERCISES: CPT

## 2018-03-19 PROCEDURE — 6370000000 HC RX 637 (ALT 250 FOR IP): Performed by: THORACIC SURGERY (CARDIOTHORACIC VASCULAR SURGERY)

## 2018-03-19 RX ORDER — POTASSIUM CHLORIDE 20 MEQ/1
20 TABLET, EXTENDED RELEASE ORAL ONCE
Status: DISCONTINUED | OUTPATIENT
Start: 2018-03-19 | End: 2018-03-20 | Stop reason: HOSPADM

## 2018-03-19 RX ORDER — ATORVASTATIN CALCIUM 40 MG/1
40 TABLET, FILM COATED ORAL NIGHTLY
Status: DISCONTINUED | OUTPATIENT
Start: 2018-03-19 | End: 2018-03-20 | Stop reason: HOSPADM

## 2018-03-19 RX ORDER — FUROSEMIDE 40 MG/1
40 TABLET ORAL 2 TIMES DAILY
Status: DISCONTINUED | OUTPATIENT
Start: 2018-03-19 | End: 2018-03-20 | Stop reason: HOSPADM

## 2018-03-19 RX ORDER — LISINOPRIL 10 MG/1
10 TABLET ORAL DAILY
Status: DISCONTINUED | OUTPATIENT
Start: 2018-03-19 | End: 2018-03-20 | Stop reason: HOSPADM

## 2018-03-19 RX ADMIN — DOCUSATE SODIUM 100 MG: 100 CAPSULE ORAL at 07:50

## 2018-03-19 RX ADMIN — ENOXAPARIN SODIUM 30 MG: 30 INJECTION SUBCUTANEOUS at 20:58

## 2018-03-19 RX ADMIN — OXYCODONE HYDROCHLORIDE AND ACETAMINOPHEN 2 TABLET: 5; 325 TABLET ORAL at 03:32

## 2018-03-19 RX ADMIN — PREGABALIN 150 MG: 75 CAPSULE ORAL at 07:50

## 2018-03-19 RX ADMIN — ENOXAPARIN SODIUM 30 MG: 30 INJECTION SUBCUTANEOUS at 07:51

## 2018-03-19 RX ADMIN — FUROSEMIDE 40 MG: 40 TABLET ORAL at 17:19

## 2018-03-19 RX ADMIN — CHLORHEXIDINE GLUCONATE 15 ML: 1.2 RINSE ORAL at 07:51

## 2018-03-19 RX ADMIN — MULTIPLE VITAMINS W/ MINERALS TAB 1 TABLET: TAB at 07:50

## 2018-03-19 RX ADMIN — PREGABALIN 150 MG: 75 CAPSULE ORAL at 20:58

## 2018-03-19 RX ADMIN — MOMETASONE FUROATE AND FORMOTEROL FUMARATE DIHYDRATE 2 PUFF: 200; 5 AEROSOL RESPIRATORY (INHALATION) at 19:58

## 2018-03-19 RX ADMIN — LISINOPRIL 10 MG: 10 TABLET ORAL at 10:17

## 2018-03-19 RX ADMIN — FAMOTIDINE 20 MG: 20 TABLET, FILM COATED ORAL at 20:58

## 2018-03-19 RX ADMIN — DIPHENHYDRAMINE HCL 25 MG: 25 TABLET ORAL at 22:36

## 2018-03-19 RX ADMIN — OXYCODONE HYDROCHLORIDE AND ACETAMINOPHEN 2 TABLET: 5; 325 TABLET ORAL at 21:16

## 2018-03-19 RX ADMIN — FUROSEMIDE 40 MG: 40 TABLET ORAL at 08:00

## 2018-03-19 RX ADMIN — DOCUSATE SODIUM 100 MG: 100 CAPSULE ORAL at 20:58

## 2018-03-19 RX ADMIN — DESMOPRESSIN ACETATE 40 MG: 0.2 TABLET ORAL at 20:58

## 2018-03-19 RX ADMIN — INSULIN LISPRO 1 UNITS: 100 INJECTION, SOLUTION INTRAVENOUS; SUBCUTANEOUS at 08:04

## 2018-03-19 RX ADMIN — OXYCODONE HYDROCHLORIDE AND ACETAMINOPHEN 2 TABLET: 5; 325 TABLET ORAL at 08:02

## 2018-03-19 RX ADMIN — FENTANYL CITRATE 50 MCG: 50 INJECTION INTRAMUSCULAR; INTRAVENOUS at 05:34

## 2018-03-19 RX ADMIN — FENTANYL CITRATE 50 MCG: 50 INJECTION INTRAMUSCULAR; INTRAVENOUS at 00:13

## 2018-03-19 RX ADMIN — FUROSEMIDE 40 MG: 10 INJECTION, SOLUTION INTRAMUSCULAR; INTRAVENOUS at 05:34

## 2018-03-19 RX ADMIN — CLOPIDOGREL 75 MG: 75 TABLET, FILM COATED ORAL at 07:51

## 2018-03-19 RX ADMIN — FAMOTIDINE 20 MG: 20 TABLET, FILM COATED ORAL at 07:50

## 2018-03-19 RX ADMIN — PREGABALIN 150 MG: 75 CAPSULE ORAL at 14:52

## 2018-03-19 RX ADMIN — POLYETHYLENE GLYCOL 3350 17 G: 17 POWDER, FOR SOLUTION ORAL at 07:51

## 2018-03-19 RX ADMIN — CHLORHEXIDINE GLUCONATE 15 ML: 1.2 RINSE ORAL at 20:58

## 2018-03-19 RX ADMIN — ALUMINUM HYDROXIDE, MAGNESIUM HYDROXIDE, AND SIMETHICONE 20 ML: 200; 200; 20 SUSPENSION ORAL at 05:45

## 2018-03-19 RX ADMIN — OXYCODONE HYDROCHLORIDE AND ACETAMINOPHEN 2 TABLET: 5; 325 TABLET ORAL at 17:18

## 2018-03-19 RX ADMIN — MONTELUKAST SODIUM 10 MG: 10 TABLET, FILM COATED ORAL at 07:50

## 2018-03-19 RX ADMIN — MOMETASONE FUROATE AND FORMOTEROL FUMARATE DIHYDRATE 2 PUFF: 200; 5 AEROSOL RESPIRATORY (INHALATION) at 08:43

## 2018-03-19 RX ADMIN — WARFARIN SODIUM 5 MG: 5 TABLET ORAL at 17:20

## 2018-03-19 RX ADMIN — METOPROLOL TARTRATE 25 MG: 25 TABLET ORAL at 07:50

## 2018-03-19 RX ADMIN — AMITRIPTYLINE HYDROCHLORIDE 50 MG: 50 TABLET, FILM COATED ORAL at 07:51

## 2018-03-19 RX ADMIN — METOPROLOL TARTRATE 25 MG: 25 TABLET ORAL at 20:58

## 2018-03-19 RX ADMIN — MAGNESIUM HYDROXIDE 30 ML: 400 SUSPENSION ORAL at 10:17

## 2018-03-19 ASSESSMENT — PAIN DESCRIPTION - LOCATION: LOCATION: CHEST

## 2018-03-19 ASSESSMENT — PAIN SCALES - GENERAL
PAINLEVEL_OUTOF10: 7
PAINLEVEL_OUTOF10: 6
PAINLEVEL_OUTOF10: 9
PAINLEVEL_OUTOF10: 4
PAINLEVEL_OUTOF10: 8
PAINLEVEL_OUTOF10: 8
PAINLEVEL_OUTOF10: 7

## 2018-03-19 ASSESSMENT — PAIN DESCRIPTION - PAIN TYPE: TYPE: SURGICAL PAIN

## 2018-03-19 NOTE — PROGRESS NOTES
Physical Therapy  Facility/Department: [facility] CAR 1  Daily Treatment Note  NAME: Shira Samuels  : 1965  MRN: [id number]    Date of Service: 3/19/2018    Patient Diagnosis(es):   Patient Active Problem List    Diagnosis Date Noted    Chest pain 03/15/2018    CAD, multiple vessel 03/15/2018    Bicuspid aortic valve     Severe calcific aortic stenosis     Angina, class III (HCC)     Moderate to severe aortic stenosis 2018       Past Medical History:   Diagnosis Date    Arthritis     [facility]     [facility]F [facility]congestive heart failure) [facility]     COPD (chronic obstructive pulmonary disease) [facility]     H/O echocardiogram 2018    Normal LV . EF 55-60%. Severe concentic hypertrophy. Aortic stenosis. moderate. Peak gradient is 67 mmHg. Mean gredient is 37 mmHg. Cannot exclude bicuspid aortic valve. Moderate to severe aortic valve regurgitation. Past Surgical History:   Procedure Laterality Date    CARDIAC CATHETERIZATION Left 03/15/2018    Right radial/ [facility]) Wilfred/DR Lutz-severe mitral valve disease and multivessel coronary artery disease    VASECTOMY         Restrictions  Restrictions/Precautions  Restrictions/Precautions: Cardiac  Required Braces or Orthoses?: No  Position Activity Restriction  Sternal Precautions: No Pushing, No Pulling, 5# Lifting Restrictions  Other position/activity restrictions: Up to chair day of surgery, S/P CABGX2, AVR, AAA Resection 3/16/18. Subjective   Pt sitting up in his chair upon arrival. Pt appears to self limit himself with walking distance and ambulating w/o a device. Pt c/o \"Sour stomach\" from eating breakfast, RN informed.  Pt has no c/o pain     Orientation  WNL  Objective  Transfers  Sit to Stand: Contact guard assistance  Stand to sit: Contact guard assistance    Ambulation  Ambulation?: Yes  Ambulation 1  Surface: level tile  Device: Rolling Walker  Other Apparatus:  2L 02  Assistance: CGA  Quality of

## 2018-03-19 NOTE — PROGRESS NOTES
Moderate to severe aortic stenosis     Chest pain     CAD, multiple vessel     Bicuspid aortic valve     Severe calcific aortic stenosis     Angina, class III ([address]      Plan of Treatment:   1. Follow-up INR  2.  Change simvastatin to Lipitor as per ACC/AHA guidelines as the patient has clinical ASCVD      Electronically signed by Jerry Pedroza MD on 3/19/2018 at [address] [facility]  Cardiology fellow
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
## 2018-03-19 NOTE — PROGRESS NOTES
REQUIRES OT FOLLOW UP: Yes  Activity Tolerance  Activity Tolerance: Patient Tolerated treatment well;Patient limited by pain  Activity Tolerance: Pt reports pain from RA on this date  Safety Devices  Safety Devices in place: Yes  Type of devices: Left in chair;Nurse notified;Call light within reach;Gait belt;Patient at risk for falls  Restraints  Initially in place: No     Plan   Plan  Times per week: 4-5x/wk  Current Treatment Recommendations: Functional Mobility Training, Endurance Training, Safety Education & Training, Self-Care / ADL, Patient/Caregiver Education & Training, Equipment Evaluation, Education, & procurement, Home Management Training  Goals  Short term goals  Time Frame for Short term goals: by discharge, pt will  Short term goal 1: demo I in UE ADL activites   Short term goal 2: demo MI/min A for LE ADL activites with AD as needed  Short term goal 3: demo understanding and I use of EC/WS, fall prevention, sternal precautions and proper pursed lipped breathing tech during functional activities   Short term goal 4: demo understanding and I use of safe transfer tech during functional activites with LRD  Short term goal 5: demo increased activity tolerance of 20+ min to assist with ADL/ functional activities   Short term goal 6: demo I in functional transfers/ mobility to assist with ADL/ functional activities   Patient Goals   Patient goals : to get moving       Therapy Time   Individual Concurrent Group Co-treatment   Time In  1510         Time Out  1605         Minutes                   Radha RAHMAN/PRINCE JUNIOR/MOISÉS

## 2018-03-20 VITALS
HEIGHT: 67 IN | SYSTOLIC BLOOD PRESSURE: 111 MMHG | HEART RATE: 75 BPM | WEIGHT: 244.8 LBS | OXYGEN SATURATION: 98 % | BODY MASS INDEX: 38.42 KG/M2 | RESPIRATION RATE: 16 BRPM | DIASTOLIC BLOOD PRESSURE: 55 MMHG | TEMPERATURE: 98.1 F

## 2018-03-20 LAB
ABSOLUTE EOS #: 0.24 K/UL (ref 0–0.4)
ABSOLUTE IMMATURE GRANULOCYTE: 0 K/UL (ref 0–0.3)
ABSOLUTE LYMPH #: 1.89 K/UL (ref 1–4.8)
ABSOLUTE MONO #: 1.53 K/UL (ref 0.1–0.8)
ANION GAP SERPL CALCULATED.3IONS-SCNC: 11 MMOL/L (ref 9–17)
BASOPHILS # BLD: 0 % (ref 0–2)
BASOPHILS ABSOLUTE: 0 K/UL (ref 0–0.2)
BUN BLDV-MCNC: 13 MG/DL (ref 6–20)
BUN/CREAT BLD: ABNORMAL (ref 9–20)
CALCIUM SERPL-MCNC: 8.2 MG/DL (ref 8.6–10.4)
CHLORIDE BLD-SCNC: 96 MMOL/L (ref 98–107)
CO2: 29 MMOL/L (ref 20–31)
CREAT SERPL-MCNC: 0.62 MG/DL (ref 0.7–1.2)
DIFFERENTIAL TYPE: ABNORMAL
EOSINOPHILS RELATIVE PERCENT: 2 % (ref 1–4)
GFR AFRICAN AMERICAN: >60 ML/MIN
GFR NON-AFRICAN AMERICAN: >60 ML/MIN
GFR SERPL CREATININE-BSD FRML MDRD: ABNORMAL ML/MIN/{1.73_M2}
GFR SERPL CREATININE-BSD FRML MDRD: ABNORMAL ML/MIN/{1.73_M2}
GLUCOSE BLD-MCNC: 103 MG/DL (ref 75–110)
GLUCOSE BLD-MCNC: 106 MG/DL (ref 70–99)
GLUCOSE BLD-MCNC: 91 MG/DL (ref 75–110)
HCT VFR BLD CALC: 27.7 % (ref 40.7–50.3)
HEMOGLOBIN: 8.6 G/DL (ref 13–17)
IMMATURE GRANULOCYTES: 0 %
INR BLD: 1
LYMPHOCYTES # BLD: 16 % (ref 24–44)
MAGNESIUM: 2.4 MG/DL (ref 1.6–2.6)
MCH RBC QN AUTO: 30.1 PG (ref 25.2–33.5)
MCHC RBC AUTO-ENTMCNC: 31 G/DL (ref 28.4–34.8)
MCV RBC AUTO: 96.9 FL (ref 82.6–102.9)
MONOCYTES # BLD: 13 % (ref 1–7)
MORPHOLOGY: NORMAL
NRBC AUTOMATED: 0 PER 100 WBC
PDW BLD-RTO: 13.6 % (ref 11.8–14.4)
PLATELET # BLD: 259 K/UL (ref 138–453)
PLATELET ESTIMATE: ABNORMAL
PMV BLD AUTO: 10.8 FL (ref 8.1–13.5)
POTASSIUM SERPL-SCNC: 4.9 MMOL/L (ref 3.7–5.3)
PROTHROMBIN TIME: 11.1 SEC (ref 9–12)
RBC # BLD: 2.86 M/UL (ref 4.21–5.77)
RBC # BLD: ABNORMAL 10*6/UL
SEG NEUTROPHILS: 69 % (ref 36–66)
SEGMENTED NEUTROPHILS ABSOLUTE COUNT: 8.14 K/UL (ref 1.8–7.7)
SODIUM BLD-SCNC: 136 MMOL/L (ref 135–144)
SURGICAL PATHOLOGY REPORT: NORMAL
WBC # BLD: 11.8 K/UL (ref 3.5–11.3)
WBC # BLD: ABNORMAL 10*3/UL

## 2018-03-20 PROCEDURE — 6370000000 HC RX 637 (ALT 250 FOR IP): Performed by: PHYSICIAN ASSISTANT

## 2018-03-20 PROCEDURE — 99024 POSTOP FOLLOW-UP VISIT: CPT | Performed by: PHYSICIAN ASSISTANT

## 2018-03-20 PROCEDURE — 6360000002 HC RX W HCPCS: Performed by: NURSE PRACTITIONER

## 2018-03-20 PROCEDURE — 85025 COMPLETE CBC W/AUTO DIFF WBC: CPT

## 2018-03-20 PROCEDURE — 97116 GAIT TRAINING THERAPY: CPT

## 2018-03-20 PROCEDURE — 6370000000 HC RX 637 (ALT 250 FOR IP): Performed by: NURSE PRACTITIONER

## 2018-03-20 PROCEDURE — 6370000000 HC RX 637 (ALT 250 FOR IP): Performed by: THORACIC SURGERY (CARDIOTHORACIC VASCULAR SURGERY)

## 2018-03-20 PROCEDURE — 85610 PROTHROMBIN TIME: CPT

## 2018-03-20 PROCEDURE — 36415 COLL VENOUS BLD VENIPUNCTURE: CPT

## 2018-03-20 PROCEDURE — 82947 ASSAY GLUCOSE BLOOD QUANT: CPT

## 2018-03-20 PROCEDURE — 97530 THERAPEUTIC ACTIVITIES: CPT

## 2018-03-20 PROCEDURE — 94762 N-INVAS EAR/PLS OXIMTRY CONT: CPT

## 2018-03-20 PROCEDURE — 83735 ASSAY OF MAGNESIUM: CPT

## 2018-03-20 PROCEDURE — 80048 BASIC METABOLIC PNL TOTAL CA: CPT

## 2018-03-20 PROCEDURE — 94640 AIRWAY INHALATION TREATMENT: CPT

## 2018-03-20 PROCEDURE — 99024 POSTOP FOLLOW-UP VISIT: CPT | Performed by: NURSE PRACTITIONER

## 2018-03-20 RX ORDER — WARFARIN SODIUM 5 MG/1
TABLET ORAL
Qty: 30 TABLET | Refills: 3 | Status: SHIPPED | OUTPATIENT
Start: 2018-03-20 | End: 2018-12-12 | Stop reason: ALTCHOICE

## 2018-03-20 RX ORDER — POTASSIUM CHLORIDE 20 MEQ/1
20 TABLET, EXTENDED RELEASE ORAL 2 TIMES DAILY
Qty: 36 TABLET | Refills: 3 | Status: SHIPPED | OUTPATIENT
Start: 2018-03-20 | End: 2018-03-28 | Stop reason: SDUPTHER

## 2018-03-20 RX ORDER — OXYCODONE HYDROCHLORIDE AND ACETAMINOPHEN 5; 325 MG/1; MG/1
1 TABLET ORAL EVERY 6 HOURS PRN
Qty: 28 TABLET | Refills: 0 | Status: SHIPPED | OUTPATIENT
Start: 2018-03-20 | End: 2018-03-27

## 2018-03-20 RX ORDER — CLOPIDOGREL BISULFATE 75 MG/1
75 TABLET ORAL DAILY
Qty: 30 TABLET | Refills: 3 | Status: SHIPPED | OUTPATIENT
Start: 2018-03-21 | End: 2019-04-02

## 2018-03-20 RX ORDER — ATORVASTATIN CALCIUM 40 MG/1
40 TABLET, FILM COATED ORAL NIGHTLY
Qty: 30 TABLET | Refills: 3 | Status: SHIPPED | OUTPATIENT
Start: 2018-03-20

## 2018-03-20 RX ORDER — PSEUDOEPHEDRINE HCL 30 MG
100 TABLET ORAL 2 TIMES DAILY
Qty: 60 CAPSULE | Refills: 0 | Status: SHIPPED | OUTPATIENT
Start: 2018-03-20 | End: 2018-06-13 | Stop reason: ALTCHOICE

## 2018-03-20 RX ORDER — FAMOTIDINE 20 MG/1
20 TABLET, FILM COATED ORAL 2 TIMES DAILY
Qty: 60 TABLET | Refills: 3 | Status: SHIPPED | OUTPATIENT
Start: 2018-03-20 | End: 2021-09-29

## 2018-03-20 RX ORDER — FUROSEMIDE 40 MG/1
40 TABLET ORAL 2 TIMES DAILY
Qty: 36 TABLET | Refills: 1 | Status: SHIPPED | OUTPATIENT
Start: 2018-03-20 | End: 2018-03-28 | Stop reason: SDUPTHER

## 2018-03-20 RX ORDER — LISINOPRIL 10 MG/1
10 TABLET ORAL DAILY
Qty: 30 TABLET | Refills: 3 | Status: SHIPPED | OUTPATIENT
Start: 2018-03-21 | End: 2019-04-02

## 2018-03-20 RX ADMIN — OXYCODONE HYDROCHLORIDE AND ACETAMINOPHEN 2 TABLET: 5; 325 TABLET ORAL at 05:29

## 2018-03-20 RX ADMIN — FUROSEMIDE 40 MG: 40 TABLET ORAL at 07:53

## 2018-03-20 RX ADMIN — OXYCODONE HYDROCHLORIDE AND ACETAMINOPHEN 2 TABLET: 5; 325 TABLET ORAL at 01:25

## 2018-03-20 RX ADMIN — OXYCODONE HYDROCHLORIDE AND ACETAMINOPHEN 2 TABLET: 5; 325 TABLET ORAL at 13:56

## 2018-03-20 RX ADMIN — PREGABALIN 150 MG: 75 CAPSULE ORAL at 07:53

## 2018-03-20 RX ADMIN — FAMOTIDINE 20 MG: 20 TABLET, FILM COATED ORAL at 07:53

## 2018-03-20 RX ADMIN — OXYCODONE HYDROCHLORIDE AND ACETAMINOPHEN 2 TABLET: 5; 325 TABLET ORAL at 09:22

## 2018-03-20 RX ADMIN — METOPROLOL TARTRATE 25 MG: 25 TABLET ORAL at 07:53

## 2018-03-20 RX ADMIN — CHLORHEXIDINE GLUCONATE 15 ML: 1.2 RINSE ORAL at 07:54

## 2018-03-20 RX ADMIN — LISINOPRIL 10 MG: 10 TABLET ORAL at 07:53

## 2018-03-20 RX ADMIN — MULTIPLE VITAMINS W/ MINERALS TAB 1 TABLET: TAB at 07:53

## 2018-03-20 RX ADMIN — MOMETASONE FUROATE AND FORMOTEROL FUMARATE DIHYDRATE 2 PUFF: 200; 5 AEROSOL RESPIRATORY (INHALATION) at 07:59

## 2018-03-20 RX ADMIN — ENOXAPARIN SODIUM 30 MG: 30 INJECTION SUBCUTANEOUS at 07:53

## 2018-03-20 RX ADMIN — AMITRIPTYLINE HYDROCHLORIDE 50 MG: 50 TABLET, FILM COATED ORAL at 07:54

## 2018-03-20 RX ADMIN — CLOPIDOGREL 75 MG: 75 TABLET, FILM COATED ORAL at 07:54

## 2018-03-20 RX ADMIN — MONTELUKAST SODIUM 10 MG: 10 TABLET, FILM COATED ORAL at 07:53

## 2018-03-20 ASSESSMENT — PAIN SCALES - GENERAL
PAINLEVEL_OUTOF10: 7
PAINLEVEL_OUTOF10: 6
PAINLEVEL_OUTOF10: 8
PAINLEVEL_OUTOF10: 9

## 2018-03-20 NOTE — CARE COORDINATION
Spoke with [address] [facility] [address] regarding portable oxygen tank delivery, hey state they are currently delivering to patient's home and that wife will be bringing portable tank to hospital.  Notified Jesús Olson at [facility] that patient will be discharged today. Patient agreeable to discharge plan.     Discharge [facility] Case Management Department  Written by: Kathie Izaguirre RN    Patient Name: Bentley Hanson  Attending Provider: Maira Bennett MD  Admit Date: 3/15/2018  2:39 PM  MRN: [id number]  Account: [de-identified]                     : 1965  Discharge Date: 3/20/18      Disposition: home with [address] Alen Owen RN
support. Daughter is aide, can assist at home when discharged.   Will discuss home care after surgery        Electronically signed by Vimal Quiñones RN on 3/15/18 at 5:23 PM
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
## 2018-03-20 NOTE — PROGRESS NOTES
[facility] Cardiothoracic Surgery  Progress Note    3/20/2018 7:44 AM  Surgeon: Lorenzo Blanco MD  POD # 4  S/P: CABG X 2, LIMA to LAD and D in seq, AVR with 23 mm CE Magna bioprosthesis, ascending aortic aneurysm resection and replacement with 28 mm Dacron tube graft    Subjective:  Mr. Gera Barrera ambulating in room. No BM positive flatulence    Objective:  /74   Pulse 105   Temp 97.9 °F (36.6 °C) (Oral)   Resp 16   Ht 5' 7\" (1.702 m)   Wt 244 lb 12.8 oz (111 kg)   SpO2 98%   BMI 38.34 kg/m²     Labs:   CBC:   Recent Labs      03/18/18   0412  03/19/18   0436  03/20/18   0501   WBC  15.2*  15.7*  11.8*   HGB  10.0*  8.8*  8.6*   HCT  32.0*  29.6*  27.7*   MCV  96.1  100.7  96.9   PLT  102*  182  259     BMP:   Recent Labs      03/18/18   0412  03/19/18   0436  03/20/18   0501   NA  136  135  136   K  4.1  4.8  4.9   CL  103  102  96*   CO2  22  25  29   BUN  14  13  13   CREATININE  0.79  0.63*  0.62*     PT/INR:    Lab Results   Component Value Date    PROTIME 11.1 03/20/2018    INR 1.0 03/20/2018     I/O: I/O last 3 completed shifts:   In: 1200 [P.O.:1200]  Out: 2470 [Urine:2470]     Scheduled Meds:   furosemide  40 mg Oral BID    potassium chloride  20 mEq Oral Once    metoprolol tartrate  25 mg Oral BID    atorvastatin  40 mg Oral Nightly    lisinopril  10 mg Oral Daily    warfarin (COUMADIN) daily dosing (placeholder)   Other RX Placeholder    warfarin  5 mg Oral Daily    docusate sodium  100 mg Oral BID    polyethylene glycol  17 g Oral Daily    amitriptyline  50 mg Oral Daily    montelukast  10 mg Oral Daily    mometasone-formoterol  2 puff Inhalation BID    enoxaparin  30 mg Subcutaneous BID    famotidine  20 mg Oral BID    pregabalin  150 mg Oral TID    chlorhexidine  15 mL Mouth/Throat BID    therapeutic multivitamin-minerals  1 tablet Oral Daily with breakfast    clopidogrel  75 mg Oral Daily    insulin lispro  0-6 Units Subcutaneous TID     insulin lispro  0-3 Units Subcutaneous

## 2018-03-20 NOTE — PLAN OF CARE
DISCHARGE INSTRUCTIONS    Discharge instruction given: to pt his spouse, mother and daughter at bedside, questionsed answered    Reviewed Your Care After Open Heart Surgery Book reviewed  Supplies needed  Activity instructions  Incisional Care  Weight and temperature   Pain Medication  Margarito Hose  Incentive spirometer   Smoking Cessation  When to call your Doctor  Follow up appointments reviewed  Call Primary Doctor if blood sugars consistently above 130 or symptoms of high or low blood sugars  Call Primary Care Doctor if feelings of depression persist  Reinforced pt to participate in outpt cardiac rehabilitation program  Cardiac rehab referral faxed with face sheet, op note and H&P to [facility] with:pt and family    What is Coumadin  How does coumadin work  Why do I need Coumadin  How should I take coumadin  What are the possible side effects of coumadin  What to report  Over the counter medication to avoid  Vitamins that affect how your body responds to coumadin  PT INR meaning and lab draws  Next draw Thursday    Verbalized understanding of all discharge instructions    Midsternal incision open to air with steri strips appears there are staples under the steri strips clean dry and intact    chest tubes x 2 with steri strisp clean dry intact    Rt upper chest iv site with drsg instructed to remove before shower tomorrow

## 2018-03-21 LAB
EKG ATRIAL RATE: 100 BPM
EKG P AXIS: 12 DEGREES
EKG P-R INTERVAL: 180 MS
EKG Q-T INTERVAL: 394 MS
EKG QRS DURATION: 158 MS
EKG QTC CALCULATION (BAZETT): 508 MS
EKG R AXIS: 85 DEGREES
EKG T AXIS: 0 DEGREES
EKG VENTRICULAR RATE: 100 BPM

## 2018-03-26 ENCOUNTER — TELEPHONE (OUTPATIENT)
Dept: CARDIOTHORACIC SURGERY | Age: 53
End: 2018-03-26

## 2018-03-26 DIAGNOSIS — Z95.1 S/P CABG X 2: Primary | ICD-10-CM

## 2018-03-26 RX ORDER — TRAMADOL HYDROCHLORIDE 50 MG/1
50 TABLET ORAL EVERY 6 HOURS PRN
Qty: 28 TABLET | Refills: 0 | Status: SHIPPED | OUTPATIENT
Start: 2018-03-26 | End: 2018-04-02

## 2018-03-28 ENCOUNTER — OFFICE VISIT (OUTPATIENT)
Dept: CARDIOTHORACIC SURGERY | Age: 53
End: 2018-03-28

## 2018-03-28 VITALS
RESPIRATION RATE: 19 BRPM | SYSTOLIC BLOOD PRESSURE: 100 MMHG | TEMPERATURE: 98.8 F | DIASTOLIC BLOOD PRESSURE: 59 MMHG | OXYGEN SATURATION: 95 % | WEIGHT: 232 LBS | HEIGHT: 67 IN | HEART RATE: 87 BPM | BODY MASS INDEX: 36.41 KG/M2

## 2018-03-28 DIAGNOSIS — Z98.890 S/P ASCENDING AORTIC ANEURYSM REPAIR: ICD-10-CM

## 2018-03-28 DIAGNOSIS — Z95.2 S/P AVR (AORTIC VALVE REPLACEMENT): ICD-10-CM

## 2018-03-28 DIAGNOSIS — Z95.1 S/P CABG X 2: Primary | ICD-10-CM

## 2018-03-28 DIAGNOSIS — Z86.79 S/P ASCENDING AORTIC ANEURYSM REPAIR: ICD-10-CM

## 2018-03-28 PROCEDURE — 99024 POSTOP FOLLOW-UP VISIT: CPT | Performed by: NURSE PRACTITIONER

## 2018-03-28 RX ORDER — FUROSEMIDE 40 MG/1
40 TABLET ORAL DAILY
Qty: 30 TABLET | Refills: 1 | Status: SHIPPED | OUTPATIENT
Start: 2018-03-28 | End: 2018-04-03

## 2018-03-28 RX ORDER — POTASSIUM CHLORIDE 20 MEQ/1
20 TABLET, EXTENDED RELEASE ORAL DAILY
Qty: 30 TABLET | Refills: 3 | Status: SHIPPED | OUTPATIENT
Start: 2018-03-28 | End: 2018-05-09

## 2018-03-28 NOTE — PROGRESS NOTES
by Adrianne Andrew MD at [place] [address] Hx: reports that he quit smoking about 2 weeks ago. His smoking use included Cigarettes, Pipe, and Cigars. He has a 40.00 pack-year smoking history. He has never used smokeless tobacco.    Assessment & Plan:   1. Low BP today. Asymptomatic, SBP runs 120-130s at home. On Lisinopril 10mg and Metoprolol 25mg. No changes at this time, may consider decreasing Lisinopril dose in the future if needed. 2. [facility] in [address] outpatient Coumadin. Advised to hold Coumadin today and tomorrow, restart 2.5 mg on Friday. Then, alternate between 5 mg and 2.5 mg every other day. 3. Pitting edema to BLE. Has not yet taken Lasix today. Advised to continue with 40 mg Lasix daily, pt follows up with Dr. Monica Maldonado on 4/3. Cardiology to determine if decrease or continuing with current dose. Pt to follow up with family medicine on 4/11.  4. All staples from sternal incision removed today. OK to leave open to air. Continue to cleanse with chlorhexidine wash daily. Continue home O2 as needed. 5. Follow up with Derrell Enriquez CNP in 3 months. The above recommendations including medications and orders were discussed and agreed upon with .     Derrell Enriquez, [address] [facility]  Office Phone: [phone]

## 2018-04-03 ENCOUNTER — OFFICE VISIT (OUTPATIENT)
Dept: CARDIOLOGY | Age: 53
End: 2018-04-03
Payer: MEDICARE

## 2018-04-03 VITALS
HEART RATE: 66 BPM | RESPIRATION RATE: 18 BRPM | WEIGHT: 222 LBS | HEIGHT: 67 IN | BODY MASS INDEX: 34.84 KG/M2 | DIASTOLIC BLOOD PRESSURE: 73 MMHG | SYSTOLIC BLOOD PRESSURE: 115 MMHG | OXYGEN SATURATION: 99 %

## 2018-04-03 DIAGNOSIS — I25.10 CAD, MULTIPLE VESSEL: Primary | ICD-10-CM

## 2018-04-03 DIAGNOSIS — Z95.2 S/P AVR (AORTIC VALVE REPLACEMENT) AND AORTOPLASTY: ICD-10-CM

## 2018-04-03 DIAGNOSIS — I35.0 SEVERE CALCIFIC AORTIC STENOSIS: ICD-10-CM

## 2018-04-03 DIAGNOSIS — Z95.1 S/P CABG X 2: ICD-10-CM

## 2018-04-03 PROCEDURE — 3017F COLORECTAL CA SCREEN DOC REV: CPT | Performed by: FAMILY MEDICINE

## 2018-04-03 PROCEDURE — G8598 ASA/ANTIPLAT THER USED: HCPCS | Performed by: FAMILY MEDICINE

## 2018-04-03 PROCEDURE — 99214 OFFICE O/P EST MOD 30 MIN: CPT | Performed by: FAMILY MEDICINE

## 2018-04-03 PROCEDURE — 1036F TOBACCO NON-USER: CPT | Performed by: FAMILY MEDICINE

## 2018-04-03 PROCEDURE — 1111F DSCHRG MED/CURRENT MED MERGE: CPT | Performed by: FAMILY MEDICINE

## 2018-04-03 PROCEDURE — G8417 CALC BMI ABV UP PARAM F/U: HCPCS | Performed by: FAMILY MEDICINE

## 2018-04-03 PROCEDURE — G8427 DOCREV CUR MEDS BY ELIG CLIN: HCPCS | Performed by: FAMILY MEDICINE

## 2018-04-03 RX ORDER — FUROSEMIDE 20 MG/1
20 TABLET ORAL DAILY
Qty: 90 TABLET | Refills: 3 | Status: SHIPPED | OUTPATIENT
Start: 2018-04-03 | End: 2018-05-09

## 2018-04-03 RX ORDER — TRAMADOL HYDROCHLORIDE 50 MG/1
50 TABLET ORAL EVERY 6 HOURS PRN
Qty: 12 TABLET | Refills: 0 | Status: SHIPPED | OUTPATIENT
Start: 2018-04-03 | End: 2018-04-28

## 2018-04-03 RX ORDER — METOPROLOL SUCCINATE 25 MG/1
25 TABLET, EXTENDED RELEASE ORAL DAILY
Qty: 90 TABLET | Refills: 3 | Status: SHIPPED | OUTPATIENT
Start: 2018-04-03 | End: 2018-05-09

## 2018-04-09 ENCOUNTER — HOSPITAL ENCOUNTER (OUTPATIENT)
Dept: CARDIAC REHAB | Age: 53
Setting detail: THERAPIES SERIES
Discharge: HOME | End: 2018-04-09
Payer: MEDICARE

## 2018-04-09 VITALS
HEIGHT: 67 IN | WEIGHT: 217 LBS | DIASTOLIC BLOOD PRESSURE: 74 MMHG | BODY MASS INDEX: 34.06 KG/M2 | RESPIRATION RATE: 16 BRPM | HEART RATE: 74 BPM | SYSTOLIC BLOOD PRESSURE: 118 MMHG

## 2018-04-09 ASSESSMENT — PATIENT HEALTH QUESTIONNAIRE - PHQ9: SUM OF ALL RESPONSES TO PHQ QUESTIONS 1-9: 2

## 2018-04-12 ENCOUNTER — HOSPITAL ENCOUNTER (OUTPATIENT)
Dept: CARDIAC REHAB | Age: 53
Setting detail: THERAPIES SERIES
Discharge: HOME | End: 2018-04-12
Payer: MEDICARE

## 2018-04-12 PROCEDURE — 93798 PHYS/QHP OP CAR RHAB W/ECG: CPT

## 2018-04-16 ENCOUNTER — HOSPITAL ENCOUNTER (OUTPATIENT)
Dept: CARDIAC REHAB | Age: 53
Setting detail: THERAPIES SERIES
Discharge: HOME | End: 2018-04-16
Payer: MEDICARE

## 2018-04-16 PROCEDURE — 93798 PHYS/QHP OP CAR RHAB W/ECG: CPT

## 2018-04-19 ENCOUNTER — HOSPITAL ENCOUNTER (OUTPATIENT)
Dept: CARDIAC REHAB | Age: 53
Setting detail: THERAPIES SERIES
Discharge: HOME | End: 2018-04-19
Payer: MEDICARE

## 2018-04-19 PROCEDURE — 93798 PHYS/QHP OP CAR RHAB W/ECG: CPT

## 2018-04-20 ENCOUNTER — OFFICE VISIT (OUTPATIENT)
Dept: CARDIOLOGY | Age: 53
End: 2018-04-20
Payer: MEDICARE

## 2018-04-20 VITALS
BODY MASS INDEX: 34.06 KG/M2 | WEIGHT: 217 LBS | HEIGHT: 67 IN | HEART RATE: 80 BPM | RESPIRATION RATE: 20 BRPM | OXYGEN SATURATION: 96 % | SYSTOLIC BLOOD PRESSURE: 123 MMHG | DIASTOLIC BLOOD PRESSURE: 80 MMHG

## 2018-04-20 DIAGNOSIS — Z95.2 S/P AVR (AORTIC VALVE REPLACEMENT) AND AORTOPLASTY: ICD-10-CM

## 2018-04-20 DIAGNOSIS — Z95.1 S/P CABG X 2: ICD-10-CM

## 2018-04-20 DIAGNOSIS — I25.10 CAD, MULTIPLE VESSEL: ICD-10-CM

## 2018-04-20 DIAGNOSIS — R07.89 CHEST PAIN, NON-CARDIAC: Primary | ICD-10-CM

## 2018-04-20 PROCEDURE — G8417 CALC BMI ABV UP PARAM F/U: HCPCS | Performed by: FAMILY MEDICINE

## 2018-04-20 PROCEDURE — 99213 OFFICE O/P EST LOW 20 MIN: CPT | Performed by: FAMILY MEDICINE

## 2018-04-20 PROCEDURE — 1036F TOBACCO NON-USER: CPT | Performed by: FAMILY MEDICINE

## 2018-04-20 PROCEDURE — G8427 DOCREV CUR MEDS BY ELIG CLIN: HCPCS | Performed by: FAMILY MEDICINE

## 2018-04-20 PROCEDURE — G8598 ASA/ANTIPLAT THER USED: HCPCS | Performed by: FAMILY MEDICINE

## 2018-04-20 PROCEDURE — 3017F COLORECTAL CA SCREEN DOC REV: CPT | Performed by: FAMILY MEDICINE

## 2018-04-26 ENCOUNTER — HOSPITAL ENCOUNTER (OUTPATIENT)
Dept: CARDIAC REHAB | Age: 53
Setting detail: THERAPIES SERIES
Discharge: HOME | End: 2018-04-26
Payer: MEDICARE

## 2018-04-26 PROCEDURE — 93798 PHYS/QHP OP CAR RHAB W/ECG: CPT

## 2018-05-03 ENCOUNTER — HOSPITAL ENCOUNTER (OUTPATIENT)
Dept: CARDIAC REHAB | Age: 53
Setting detail: THERAPIES SERIES
Discharge: HOME | End: 2018-05-03
Payer: MEDICARE

## 2018-05-03 PROCEDURE — 93798 PHYS/QHP OP CAR RHAB W/ECG: CPT

## 2018-05-09 ENCOUNTER — OFFICE VISIT (OUTPATIENT)
Dept: CARDIOLOGY | Age: 53
End: 2018-05-09
Payer: MEDICARE

## 2018-05-09 VITALS
DIASTOLIC BLOOD PRESSURE: 77 MMHG | HEIGHT: 67 IN | WEIGHT: 222.6 LBS | BODY MASS INDEX: 34.94 KG/M2 | RESPIRATION RATE: 16 BRPM | OXYGEN SATURATION: 96 % | SYSTOLIC BLOOD PRESSURE: 114 MMHG | HEART RATE: 72 BPM

## 2018-05-09 DIAGNOSIS — Z86.79 HISTORY OF AORTIC STENOSIS: ICD-10-CM

## 2018-05-09 DIAGNOSIS — I25.708 CORONARY ARTERY DISEASE OF BYPASS GRAFT OF NATIVE HEART WITH STABLE ANGINA PECTORIS (HCC): Primary | ICD-10-CM

## 2018-05-09 DIAGNOSIS — R07.89 CHEST PAIN, NON-CARDIAC: ICD-10-CM

## 2018-05-09 DIAGNOSIS — Z95.2 S/P AVR (AORTIC VALVE REPLACEMENT): ICD-10-CM

## 2018-05-09 PROCEDURE — G8598 ASA/ANTIPLAT THER USED: HCPCS | Performed by: FAMILY MEDICINE

## 2018-05-09 PROCEDURE — G8417 CALC BMI ABV UP PARAM F/U: HCPCS | Performed by: FAMILY MEDICINE

## 2018-05-09 PROCEDURE — G8427 DOCREV CUR MEDS BY ELIG CLIN: HCPCS | Performed by: FAMILY MEDICINE

## 2018-05-09 PROCEDURE — 1036F TOBACCO NON-USER: CPT | Performed by: FAMILY MEDICINE

## 2018-05-09 PROCEDURE — 3017F COLORECTAL CA SCREEN DOC REV: CPT | Performed by: FAMILY MEDICINE

## 2018-05-09 PROCEDURE — 99214 OFFICE O/P EST MOD 30 MIN: CPT | Performed by: FAMILY MEDICINE

## 2018-05-10 ENCOUNTER — HOSPITAL ENCOUNTER (OUTPATIENT)
Dept: CARDIAC REHAB | Age: 53
Setting detail: THERAPIES SERIES
Discharge: HOME | End: 2018-05-10
Payer: MEDICARE

## 2018-05-10 PROCEDURE — 93798 PHYS/QHP OP CAR RHAB W/ECG: CPT

## 2018-05-14 ENCOUNTER — HOSPITAL ENCOUNTER (OUTPATIENT)
Dept: CARDIAC REHAB | Age: 53
Setting detail: THERAPIES SERIES
Discharge: HOME | End: 2018-05-14
Payer: MEDICARE

## 2018-05-14 PROCEDURE — 93798 PHYS/QHP OP CAR RHAB W/ECG: CPT

## 2018-05-17 ENCOUNTER — HOSPITAL ENCOUNTER (OUTPATIENT)
Dept: CARDIAC REHAB | Age: 53
Setting detail: THERAPIES SERIES
Discharge: HOME | End: 2018-05-17
Payer: MEDICARE

## 2018-05-17 PROCEDURE — 93798 PHYS/QHP OP CAR RHAB W/ECG: CPT

## 2018-05-21 ENCOUNTER — HOSPITAL ENCOUNTER (OUTPATIENT)
Dept: CARDIAC REHAB | Age: 53
Setting detail: THERAPIES SERIES
Discharge: HOME | End: 2018-05-21
Payer: MEDICARE

## 2018-05-21 PROCEDURE — 93798 PHYS/QHP OP CAR RHAB W/ECG: CPT

## 2018-05-24 ENCOUNTER — HOSPITAL ENCOUNTER (OUTPATIENT)
Dept: CARDIAC REHAB | Age: 53
Setting detail: THERAPIES SERIES
Discharge: HOME | End: 2018-05-24
Payer: MEDICARE

## 2018-05-24 PROCEDURE — 93798 PHYS/QHP OP CAR RHAB W/ECG: CPT

## 2018-05-31 ENCOUNTER — HOSPITAL ENCOUNTER (OUTPATIENT)
Dept: CARDIAC REHAB | Age: 53
Setting detail: THERAPIES SERIES
Discharge: HOME | End: 2018-05-31
Payer: MEDICARE

## 2018-05-31 PROCEDURE — 93798 PHYS/QHP OP CAR RHAB W/ECG: CPT

## 2018-06-07 ENCOUNTER — HOSPITAL ENCOUNTER (OUTPATIENT)
Dept: CARDIAC REHAB | Age: 53
Setting detail: THERAPIES SERIES
Discharge: HOME | End: 2018-06-07
Payer: MEDICARE

## 2018-06-07 PROCEDURE — 93798 PHYS/QHP OP CAR RHAB W/ECG: CPT

## 2018-06-11 ENCOUNTER — HOSPITAL ENCOUNTER (OUTPATIENT)
Dept: CARDIAC REHAB | Age: 53
Setting detail: THERAPIES SERIES
Discharge: HOME | End: 2018-06-11
Payer: MEDICARE

## 2018-06-11 PROCEDURE — 93798 PHYS/QHP OP CAR RHAB W/ECG: CPT

## 2018-06-13 ENCOUNTER — OFFICE VISIT (OUTPATIENT)
Dept: CARDIOTHORACIC SURGERY | Age: 53
End: 2018-06-13

## 2018-06-13 ENCOUNTER — HOSPITAL ENCOUNTER (OUTPATIENT)
Age: 53
Discharge: HOME | End: 2018-06-15
Payer: MEDICARE

## 2018-06-13 ENCOUNTER — HOSPITAL ENCOUNTER (OUTPATIENT)
Dept: GENERAL RADIOLOGY | Age: 53
Discharge: HOME | End: 2018-06-15
Payer: MEDICARE

## 2018-06-13 ENCOUNTER — HOSPITAL ENCOUNTER (OUTPATIENT)
Age: 53
Discharge: HOME | End: 2018-06-13
Payer: MEDICARE

## 2018-06-13 VITALS
BODY MASS INDEX: 35.01 KG/M2 | WEIGHT: 231 LBS | SYSTOLIC BLOOD PRESSURE: 144 MMHG | HEART RATE: 84 BPM | OXYGEN SATURATION: 98 % | HEIGHT: 68 IN | RESPIRATION RATE: 18 BRPM | TEMPERATURE: 97.1 F | DIASTOLIC BLOOD PRESSURE: 78 MMHG

## 2018-06-13 DIAGNOSIS — Z95.1 S/P CABG (CORONARY ARTERY BYPASS GRAFT): ICD-10-CM

## 2018-06-13 DIAGNOSIS — Z95.1 S/P CABG (CORONARY ARTERY BYPASS GRAFT): Primary | ICD-10-CM

## 2018-06-13 LAB
HCT VFR BLD CALC: 41.1 % (ref 40.7–50.3)
HEMOGLOBIN: 12.6 G/DL (ref 13–17)

## 2018-06-13 PROCEDURE — 36415 COLL VENOUS BLD VENIPUNCTURE: CPT

## 2018-06-13 PROCEDURE — 99024 POSTOP FOLLOW-UP VISIT: CPT | Performed by: PHYSICIAN ASSISTANT

## 2018-06-13 PROCEDURE — 85014 HEMATOCRIT: CPT

## 2018-06-13 PROCEDURE — 71046 X-RAY EXAM CHEST 2 VIEWS: CPT

## 2018-06-13 PROCEDURE — 85018 HEMOGLOBIN: CPT

## 2018-06-13 RX ORDER — NITROGLYCERIN 0.4 MG/1
0.4 TABLET SUBLINGUAL EVERY 5 MIN PRN
Refills: 0 | COMMUNITY
Start: 2018-05-07 | End: 2019-02-04 | Stop reason: SDUPTHER

## 2018-06-13 RX ORDER — FUROSEMIDE 20 MG/1
20 TABLET ORAL DAILY
Refills: 0 | COMMUNITY
Start: 2018-05-31 | End: 2019-03-17 | Stop reason: SDUPTHER

## 2018-06-13 ASSESSMENT — ENCOUNTER SYMPTOMS
NAUSEA: 0
VOMITING: 0
WHEEZING: 0
CHEST TIGHTNESS: 0
COUGH: 0
CONSTIPATION: 0
ABDOMINAL PAIN: 0
DIARRHEA: 0
SHORTNESS OF BREATH: 0

## 2018-06-14 ENCOUNTER — HOSPITAL ENCOUNTER (OUTPATIENT)
Dept: CARDIAC REHAB | Age: 53
Setting detail: THERAPIES SERIES
Discharge: HOME | End: 2018-06-14
Payer: MEDICARE

## 2018-06-14 PROCEDURE — 93798 PHYS/QHP OP CAR RHAB W/ECG: CPT

## 2018-06-18 ENCOUNTER — HOSPITAL ENCOUNTER (OUTPATIENT)
Dept: CARDIAC REHAB | Age: 53
Setting detail: THERAPIES SERIES
Discharge: HOME | End: 2018-06-18
Payer: MEDICARE

## 2018-06-18 PROCEDURE — 93798 PHYS/QHP OP CAR RHAB W/ECG: CPT

## 2018-06-21 ENCOUNTER — HOSPITAL ENCOUNTER (OUTPATIENT)
Dept: CARDIAC REHAB | Age: 53
Setting detail: THERAPIES SERIES
Discharge: HOME | End: 2018-06-21
Payer: MEDICARE

## 2018-06-21 PROCEDURE — 93798 PHYS/QHP OP CAR RHAB W/ECG: CPT

## 2018-06-28 ENCOUNTER — HOSPITAL ENCOUNTER (OUTPATIENT)
Dept: CARDIAC REHAB | Age: 53
Setting detail: THERAPIES SERIES
Discharge: HOME | End: 2018-06-28
Payer: MEDICARE

## 2018-06-28 PROCEDURE — 93798 PHYS/QHP OP CAR RHAB W/ECG: CPT

## 2018-07-09 ENCOUNTER — HOSPITAL ENCOUNTER (OUTPATIENT)
Dept: CARDIAC REHAB | Age: 53
Setting detail: THERAPIES SERIES
Discharge: HOME | End: 2018-07-09
Payer: MEDICARE

## 2018-07-09 PROCEDURE — 93798 PHYS/QHP OP CAR RHAB W/ECG: CPT

## 2018-07-12 ENCOUNTER — HOSPITAL ENCOUNTER (OUTPATIENT)
Dept: CARDIAC REHAB | Age: 53
Setting detail: THERAPIES SERIES
Discharge: HOME | End: 2018-07-12
Payer: MEDICARE

## 2018-07-12 PROCEDURE — 93798 PHYS/QHP OP CAR RHAB W/ECG: CPT

## 2018-07-23 ENCOUNTER — HOSPITAL ENCOUNTER (OUTPATIENT)
Dept: CARDIAC REHAB | Age: 53
Setting detail: THERAPIES SERIES
Discharge: HOME | End: 2018-07-23
Payer: MEDICARE

## 2018-07-23 PROCEDURE — 93798 PHYS/QHP OP CAR RHAB W/ECG: CPT

## 2018-07-26 ENCOUNTER — HOSPITAL ENCOUNTER (OUTPATIENT)
Dept: CARDIAC REHAB | Age: 53
Setting detail: THERAPIES SERIES
Discharge: HOME | End: 2018-07-26
Payer: MEDICARE

## 2018-07-26 PROCEDURE — 93798 PHYS/QHP OP CAR RHAB W/ECG: CPT

## 2018-07-30 ENCOUNTER — HOSPITAL ENCOUNTER (OUTPATIENT)
Dept: CARDIAC REHAB | Age: 53
Setting detail: THERAPIES SERIES
Discharge: HOME | End: 2018-07-30
Payer: MEDICARE

## 2018-07-30 PROCEDURE — 93798 PHYS/QHP OP CAR RHAB W/ECG: CPT

## 2018-07-31 NOTE — PROGRESS NOTES
diet  [x] Weight loss methods      [] Relate Diabetes/CAD     [x] Eating heart healthy handout    Target Goal:  -LDL-C<100 if triglycerides are > 200  -LDL-C < 70 for high risk patients  -HbA1c < 7%  -BMI < 25   Education    Stages of Change:    [] pre-contemplation   [x] Action   [] Contemplate   [] Maintainence   [x] Prep   [] Relapse    Family support: [x] Yes  [] No    Tobacco use: [] Yes  [x] No    Intervention:  [] Referred to smoking cessation counselor     [] Individual education and counseling  [] Tobacco adjunct  [] Informed of education class schedule     Education:   [x] Risk Factors/Modifications  [] Psychological aspects  [x] Angina    [] Medications  [x] CHF      [] Cardiac A&P    Target Goal:  -Complete cessation of tobacco use (if applicable)  -Continued risk factor modifications  -Recognizing signs/symptoms to report  -Proper use of meds    Psychosocial  Stages of Change:    [] pre-contemplation   [x] Action   [] Contemplate   [] Maintainence   [x] Prep   [] Relapse    Intervention:   [] Psych Consult/  [x] Uses stress management skills    [] Physician Referral    [] Stress management class   [] Med Change? Education:    [] Coping Techniques   [] Relaxation techniques   [] S/S of Depression    Target Goal:  -Assess presence or absence of depression using a valid screening tool. -Maximize coping skills.  -Positive support system.     Preventative Medication:   [] Aspirin       [x] Beta Blockade      [x] Statin or other lipid lowering agent     [x] Clopidogrel   [x] ACE Inhibitor   [x] Other anticoagulation medications     Fall Risk assess: [x] Yes  [] No  Assistive Device:   [x] Cane  [] Walker [] Wheel Chair  [] Gait belt    Patient/Program goal:     -increased stamina/strength to 30-50 total exercise by increasing 1-2 level/wk and 1-2 Pt has only attended 22times on120 days and refuses to come 3 x week.  Calls off due to chronic pain   min/wk  to achieve THR and RPE 11-13-pt initial MET was 1.9.  Pt has chronic pain and takes medication 3 times a day for that.  Pt refused to do most of the stretches.    -introduce weights/ therabands 5-10# for 5-10 reps  -manage BP better  -improved cholesterol and  Triglycerides- none on record.   -develop regular exercise 30 min daily--does stretches and \"some\" walking  Physician Changes/Comments:      Cardiac Rehab Staff Warm

## 2018-08-02 ENCOUNTER — HOSPITAL ENCOUNTER (OUTPATIENT)
Dept: CARDIAC REHAB | Age: 53
Setting detail: THERAPIES SERIES
Discharge: HOME | End: 2018-08-02
Payer: MEDICARE

## 2018-08-02 PROCEDURE — 93798 PHYS/QHP OP CAR RHAB W/ECG: CPT

## 2018-08-06 ENCOUNTER — HOSPITAL ENCOUNTER (OUTPATIENT)
Dept: CARDIAC REHAB | Age: 53
Setting detail: THERAPIES SERIES
Discharge: HOME | End: 2018-08-06
Payer: MEDICARE

## 2018-08-06 PROCEDURE — 93798 PHYS/QHP OP CAR RHAB W/ECG: CPT

## 2018-11-01 NOTE — PLAN OF CARE
"Chief Complaint   Patient presents with     Consult     Thyroid consult.     /56 (BP Location: Right arm, Patient Position: Chair, Cuff Size: Adult Small)  Pulse 80  Resp 24  Ht 4' 4.28\" (132.8 cm)  Wt 59 lb 11.9 oz (27.1 kg)  BMI 15.37 kg/m2    132.8cm, 132.8cm, 132.9cm, Ave: 132.8cm       Morena Dominguez M.A.    " Problem: Falls - Risk of  Goal: Absence of falls  Outcome: Ongoing  Keeping a clear pathway, education on call light use, anticipating needs, bed alarms    Problem: Risk for Impaired Skin Integrity  Goal: Tissue integrity - skin and mucous membranes  Structural intactness and normal physiological function of skin and  mucous membranes.    Outcome: Ongoing  Turns Q2H, lotion to skin, inspecting skin daily, encouraging protein consumption    Problem: Pain:  Goal: Pain level will decrease  Pain level will decrease   Outcome: Ongoing  Encouraging repositioning, turning Q2h, offering pain medication at specified intervals

## 2018-12-12 ENCOUNTER — OFFICE VISIT (OUTPATIENT)
Dept: CARDIOLOGY | Age: 53
End: 2018-12-12
Payer: MEDICARE

## 2018-12-12 VITALS
OXYGEN SATURATION: 95 % | HEIGHT: 68 IN | BODY MASS INDEX: 34.53 KG/M2 | DIASTOLIC BLOOD PRESSURE: 73 MMHG | WEIGHT: 227.8 LBS | SYSTOLIC BLOOD PRESSURE: 127 MMHG | HEART RATE: 90 BPM

## 2018-12-12 DIAGNOSIS — I25.810 CORONARY ARTERY DISEASE INVOLVING CORONARY BYPASS GRAFT OF NATIVE HEART WITHOUT ANGINA PECTORIS: Primary | ICD-10-CM

## 2018-12-12 DIAGNOSIS — R07.89 NON-CARDIAC CHEST PAIN: ICD-10-CM

## 2018-12-12 DIAGNOSIS — Z95.2 S/P AVR: ICD-10-CM

## 2018-12-12 PROCEDURE — G8417 CALC BMI ABV UP PARAM F/U: HCPCS | Performed by: FAMILY MEDICINE

## 2018-12-12 PROCEDURE — 3017F COLORECTAL CA SCREEN DOC REV: CPT | Performed by: FAMILY MEDICINE

## 2018-12-12 PROCEDURE — 4004F PT TOBACCO SCREEN RCVD TLK: CPT | Performed by: FAMILY MEDICINE

## 2018-12-12 PROCEDURE — 99214 OFFICE O/P EST MOD 30 MIN: CPT | Performed by: FAMILY MEDICINE

## 2018-12-12 PROCEDURE — G8484 FLU IMMUNIZE NO ADMIN: HCPCS | Performed by: FAMILY MEDICINE

## 2018-12-12 PROCEDURE — G8598 ASA/ANTIPLAT THER USED: HCPCS | Performed by: FAMILY MEDICINE

## 2018-12-12 PROCEDURE — G8427 DOCREV CUR MEDS BY ELIG CLIN: HCPCS | Performed by: FAMILY MEDICINE

## 2018-12-12 NOTE — PROGRESS NOTES
I, Nayeli Nino am scribing for and in the presence of Shaan Talbot MD.          Patient: Sue Gillespie  : 1965  Date of Visit: 2018    REASON FOR VISIT / CONSULTATION: 6 Month Follow-Up (HX: CAD, CABG, AVR. Pt states he is feeling good. C/O CP once every couple weeks lasting 10 minutes to which he takes Nitro and stops within 3-4 minutes last occuring last week with palpitations. Sore and tender insicion when he sneezes or coughs. SOB in the cold weather. Lightheadedness daily but has unchanged from last visit.)      Dear Arnoldo Lehman, ,    I had the pleasure of seeing your patient Sue Gillespie in consultation today. As you know, Mr. Ingrid Guzman is a 52 y.o. male who presents with a history of severe aortic stenosis. A pre pre-op heart catheterization also showed severe multi-vessel disease leading to a bioprosthetic AVR as well as a 2 vessel CABG and aortoplasty on 03/15/2018. Chest xray on 2018 shows intact sternal wires. Since the last time I saw Mr. Ingrid Guzman, he says he is doing well, other than some pain in his surgical incision upon leaning over, pushing, sneezing, or coughing. He also thinks that he may be able to feel something floating above the incision. He does take nitro to help with his chest pain and it help relieve this. He continues to smoke and drink alcohol. He does experience diarrhea and constipation, but has been told this is due to Lyrica. He denied any current or recent shortness of breath, abdominal pain, bleeding problems, problems with his medications or any other concerns at this time. Past Medical History:   Diagnosis Date    Arthritis     CAD (coronary artery disease)     [facility]erebral artery occlusion [facility] infarction [facility] [address]     CHF (congestive heart failure) (HCC)     COPD (chronic obstructive pulmonary disease) ([facility] [address]     H/O echocardiogram 2018    Normal LV . EF 55-60%. Severe concentic hypertrophy. Aortic stenosis. moderate.  Peak gradient is 67 mmHg. Mean gredient is 37 mmHg. Cannot exclude bicuspid aortic valve. Moderate to severe aortic valve regurgitation.  H/O transesophageal echocardiography (LUTHER) for monitoring 03/20/2018    EF 65%. Significant left ventirucular hypertrophy was noted. The aortic valve was severly calcified and appered to be functionally bicuspid. Severe aortic valve calcificatoniwith what apperas to be severe aotic stenosis. Aortic valve area by planimetary was 0.92 cm2. Based on what appears to be severe aortic stneosis, consider corrective aortic valve surgery if clinically indicated. CURRENT ALLERGIES: Patient has no known allergies. REVIEW OF SYSTEMS: 14 systems were reviewed. Pertinent positives and negatives as above, all else negative. Past Surgical History:   Procedure Laterality Date    CARDIAC CATHETERIZATION Left 03/15/2018    Right radial/ [facility] Wilfred/DR Lutz-severe mitral valve disease and multivessel coronary artery disease    IL CABG, VEIN, SINGLE N/A 3/16/2018    CABG x 2 LIMA-LAD-DIAG,  CORONARY ARTERY BYPASS AORTIC VALVE REPLACEMENT WITH 23 MAGNA , AORTIC ANEURYSM REPLACEMENT WITH  [id number],SWAN DIANN, LUTHER performed by Willie Dupont MD at [address] [place] [address] History:  Social History   Substance Use Topics    Smoking status: Current Some Day Smoker     Packs/day: 0.05     Years: 40.00     Types: Cigarettes, Pipe, Cigars     Last attempt to quit: 3/14/2018    Smokeless tobacco: Never Used      Comment: 1 pack every two weeks x2 years    Alcohol use No        CURRENT MEDICATIONS:  Outpatient Prescriptions Marked as Taking for the 12/12/18 encounter (Office Visit) with Kenroy Edmond MD   Medication Sig Dispense Refill    furosemide (LASIX) 20 MG tablet Take 20 mg by mouth daily He does not always take this.  He usually does this if his legs feel heavy or if his socks or shoes feel tight.  0    nitroGLYCERIN (NITROSTAT) 0.4 MG SL tablet Place 0.4 mg under the bilaterally. Neurological: He is alert and oriented to person, place, and time. No evidence of gross cranial nerve deficit. Coordination appeared normal.   Skin: Skin is warm and dry. There is no rash or diaphoresis. His scar is present looks clean and dry and intact. Psychiatric: He has a normal mood and affect. His speech is normal and behavior is normal.      MOST RECENT LABS ON RECORD:   Lab Results   Component Value Date    WBC 11.8 (H) 03/20/2018    HGB 12.6 (L) 06/13/2018    HCT 41.1 06/13/2018     03/20/2018     03/20/2018    K 4.9 03/20/2018    CL 96 (L) 03/20/2018    CREATININE 0.62 (L) 03/20/2018    BUN 13 03/20/2018    CO2 29 03/20/2018    INR 1.0 03/20/2018     ASSESSMENT:  1. Coronary artery disease involving coronary bypass graft of native heart without angina pectoris    2. S/P AVR    3. Non-cardiac chest pain       PLAN:  Atherosclerotic Heart Disease: CABG x2 done on 3/16/2018   Antiplatelet Agent: Continue clopidogrel (Plavix) 75 mg daily. I also reminded him to watch for signs of blood in his stool or black tarry stools and stop the medication immediately if this develops as this could be life threatening. Once he stops this, 1 year post CABG (03/15/2019), I will start him on aspirin 325 mg. Beta Blocker Therapy: Not indicated         Statin Therapy: Continue atorvastatin (Lipitor) 40 mg nightly. Testing: I Ordered an Echocardiogram to assess Mr. Kathleen Thompson ejection fraction and to look for significant valvular heart disease as a source of Mr. Edward Rose symptoms    History of Aortic Stenosis:  Aortic Valve Replacement with aortoplasty: 3/16/2018  · Beta Blocker Therapy: Not indicated      · ACE Inibitor/ARB: Continue lisinopril 10 mg     · Anticoagulation: Not indicated   · Diuretics: Stop furosemide (lasix) 20 mg however I told him that if he notices weight gain of 2-3 pounds or more to make sure he call the office so we can re-evaluate this.    · Nonpharmacologic management of

## 2018-12-21 ENCOUNTER — HOSPITAL ENCOUNTER (OUTPATIENT)
Dept: NON INVASIVE DIAGNOSTICS | Age: 53
Discharge: HOME | End: 2018-12-21
Payer: MEDICARE

## 2018-12-21 ENCOUNTER — HOSPITAL ENCOUNTER (OUTPATIENT)
Dept: GENERAL RADIOLOGY | Age: 53
Discharge: HOME | End: 2018-12-23
Payer: MEDICARE

## 2018-12-21 DIAGNOSIS — I25.810 CORONARY ARTERY DISEASE INVOLVING CORONARY BYPASS GRAFT OF NATIVE HEART WITHOUT ANGINA PECTORIS: ICD-10-CM

## 2018-12-21 DIAGNOSIS — R07.89 NON-CARDIAC CHEST PAIN: ICD-10-CM

## 2018-12-21 DIAGNOSIS — Z95.2 S/P AVR: ICD-10-CM

## 2018-12-21 LAB
LV EF: 60 %
LVEF MODALITY: NORMAL

## 2018-12-21 PROCEDURE — 93306 TTE W/DOPPLER COMPLETE: CPT

## 2018-12-21 PROCEDURE — 71046 X-RAY EXAM CHEST 2 VIEWS: CPT

## 2018-12-24 ENCOUNTER — TELEPHONE (OUTPATIENT)
Dept: CARDIOLOGY | Age: 53
End: 2018-12-24

## 2019-02-04 RX ORDER — NITROGLYCERIN 0.4 MG/1
0.4 TABLET SUBLINGUAL EVERY 5 MIN PRN
Qty: 25 TABLET | Refills: 3 | Status: SHIPPED | OUTPATIENT
Start: 2019-02-04 | End: 2020-09-09

## 2019-03-18 RX ORDER — FUROSEMIDE 20 MG/1
TABLET ORAL
Qty: 90 TABLET | Refills: 3 | Status: SHIPPED | OUTPATIENT
Start: 2019-03-18 | End: 2020-02-10

## 2019-04-02 ENCOUNTER — OFFICE VISIT (OUTPATIENT)
Dept: CARDIOLOGY | Age: 54
End: 2019-04-02
Payer: MEDICARE

## 2019-04-02 VITALS
WEIGHT: 240.6 LBS | HEIGHT: 68 IN | DIASTOLIC BLOOD PRESSURE: 87 MMHG | BODY MASS INDEX: 36.46 KG/M2 | SYSTOLIC BLOOD PRESSURE: 154 MMHG | OXYGEN SATURATION: 96 % | HEART RATE: 79 BPM | RESPIRATION RATE: 16 BRPM

## 2019-04-02 DIAGNOSIS — Z95.1 S/P CABG X 2: ICD-10-CM

## 2019-04-02 DIAGNOSIS — I25.10 CAD, MULTIPLE VESSEL: ICD-10-CM

## 2019-04-02 DIAGNOSIS — I50.32 CHRONIC DIASTOLIC CHF (CONGESTIVE HEART FAILURE), NYHA CLASS 2 (HCC): ICD-10-CM

## 2019-04-02 DIAGNOSIS — Z95.2 S/P AVR (AORTIC VALVE REPLACEMENT) AND AORTOPLASTY: ICD-10-CM

## 2019-04-02 DIAGNOSIS — I20.9 ANGINA, CLASS III (HCC): Primary | ICD-10-CM

## 2019-04-02 DIAGNOSIS — I35.0 MODERATE TO SEVERE AORTIC STENOSIS: ICD-10-CM

## 2019-04-02 PROCEDURE — 93000 ELECTROCARDIOGRAM COMPLETE: CPT | Performed by: FAMILY MEDICINE

## 2019-04-02 PROCEDURE — 3017F COLORECTAL CA SCREEN DOC REV: CPT | Performed by: FAMILY MEDICINE

## 2019-04-02 PROCEDURE — G8427 DOCREV CUR MEDS BY ELIG CLIN: HCPCS | Performed by: FAMILY MEDICINE

## 2019-04-02 PROCEDURE — G8417 CALC BMI ABV UP PARAM F/U: HCPCS | Performed by: FAMILY MEDICINE

## 2019-04-02 PROCEDURE — 4004F PT TOBACCO SCREEN RCVD TLK: CPT | Performed by: FAMILY MEDICINE

## 2019-04-02 PROCEDURE — G8598 ASA/ANTIPLAT THER USED: HCPCS | Performed by: FAMILY MEDICINE

## 2019-04-02 PROCEDURE — 99215 OFFICE O/P EST HI 40 MIN: CPT | Performed by: FAMILY MEDICINE

## 2019-04-02 RX ORDER — LISINOPRIL 20 MG/1
20 TABLET ORAL DAILY
Qty: 90 TABLET | Refills: 3 | Status: SHIPPED | OUTPATIENT
Start: 2019-04-02 | End: 2019-05-14

## 2019-04-02 RX ORDER — METOPROLOL SUCCINATE 25 MG/1
25 TABLET, EXTENDED RELEASE ORAL 2 TIMES DAILY
COMMUNITY
Start: 2019-01-16 | End: 2019-04-02

## 2019-04-02 RX ORDER — ASPIRIN 325 MG
325 TABLET, DELAYED RELEASE (ENTERIC COATED) ORAL EVERY 6 HOURS PRN
Qty: 30 TABLET | Refills: 3 | Status: SHIPPED | OUTPATIENT
Start: 2019-04-02 | End: 2019-11-19

## 2019-04-02 RX ORDER — METOPROLOL SUCCINATE 50 MG/1
50 TABLET, EXTENDED RELEASE ORAL DAILY
Qty: 90 TABLET | Refills: 3 | Status: SHIPPED | OUTPATIENT
Start: 2019-04-02 | End: 2019-11-19

## 2019-04-02 NOTE — PROGRESS NOTES
Lanie Ramirez am scribing for and in the presence of Ashley Farah MD.    Patient: Kade Gonzalez  : 1965  Date of Visit: 2019    REASON FOR VISIT / CONSULTATION: Follow-up (EKG done today. HX: CAD, AVR, CABG x 2, CP Pt is here for 4 month follow up he states he is doing well he does hace CP every once in awhile sharp,lasts 5-8 minutes he takes Nitro and goes away last night was last time he also has lightheaded/dizziness and palpitations. Denies: SOB)      Dear Patti Ibarra DO,    I had the pleasure of seeing your patient Kade Gonzalez in consultation today. As you know, Mr. Mable Gamble is a 62 y.o. male who presents with a history of severe aortic stenosis. A pre pre-op heart catheterization also showed severe multi-vessel disease leading to a bioprosthetic AVR as well as a 2 vessel CABG and aortoplasty on 03/15/2018. Chest xray on 2018 shows intact sternal wires. Since the last time I saw Mr. Mable Gamble, he says he is doing well, other than some shortness of breath which does seem a bit worse. He also recounts a couple episodes of left sided chest pressure that occurred while shoveling snow. He says this lasted 15-20 minutes and resolved with rest. He does take nitro to help with his chest pain and it help relieve this. He continues to smoke and drink alcohol but says this is much less frequent and he is try to stop. He denied any current or recent shortness of breath, abdominal pain, bleeding problems, problems with his medications or any other concerns at this time. Past Medical History:   Diagnosis Date    Arthritis     CAD (coronary artery disease)     [facility]erebral artery occlusion with [facility] infarction ([facility] [address]     CHF (congestive heart failure) (HCC)     COPD (chronic obstructive pulmonary disease) ([facility] [address]     H/O echocardiogram 2018    Normal LV . EF 55-60%. Severe concentic hypertrophy. Aortic stenosis. moderate. Peak gradient is 67 mmHg. Mean gredient is 37 mmHg.  Cannot Drug use: No        CURRENT MEDICATIONS:  Outpatient Medications Marked as Taking for the 4/2/19 encounter (Office Visit) with Hossein Meyers MD   Medication Sig Dispense Refill    metoprolol succinate (TOPROL XL) 25 MG extended release tablet Take 25 mg by mouth 2 times daily       furosemide (LASIX) 20 MG tablet take 1 tablet by mouth once daily 90 tablet 3    nitroGLYCERIN (NITROSTAT) 0.4 MG SL tablet Place 1 tablet under the tongue every 5 minutes as needed for Chest pain 25 tablet 3    atorvastatin (LIPITOR) 40 MG tablet Take 1 tablet by mouth nightly 30 tablet 3    lisinopril (PRINIVIL;ZESTRIL) 10 MG tablet Take 1 tablet by mouth daily 30 tablet 3    clopidogrel (PLAVIX) 75 MG tablet Take 1 tablet by mouth daily 30 tablet 3    albuterol (PROVENTIL) (2.5 MG/3ML) 0.083% nebulizer solution inhale contents of 1 vial in nebulizer every 4 hours if needed for wheezing  0    amitriptyline (ELAVIL) 50 MG tablet Take 50 mg by mouth daily  0    SYMBICORT 160-4.5 MCG/ACT AERO inhale 2 puffs by mouth once a day  0    montelukast (SINGULAIR) 10 MG tablet Take 10 mg by mouth daily He only takes when he feels like he needs it. 0    LYRICA 150 MG capsule Take 150 mg by mouth 3 times daily. 0    tiZANidine (ZANAFLEX) 4 MG tablet Take 4 mg by mouth 3 times daily  0       FAMILY HISTORY: Reviewed but non-contributory     PHYSICAL EXAM:   BP (!) 154/87 (Site: Right Upper Arm, Position: Sitting, Cuff Size: Medium Adult)   Pulse 79   Resp 16   Ht 5' 8\" (1.727 m)   Wt 240 lb 9.6 oz (109.1 kg)   SpO2 96%   BMI 36.58 kg/m²  Body mass index is 36.58 kg/m². Constitutional: He is oriented to person, place, and time. He appears well-developed and well-nourished. In no acute distress. HEENT: Normocephalic and atraumatic. No JVD present. Carotid bruit is not present. No mass and no thyromegaly present. No lymphadenopathy present. Cardiovascular: Normal rate, regular rhythm, normal heart sounds.  Exam reveals no gallop and no friction rubs. 2/6 systolic murmur, 4th intercostal space on the LEFT must lateral to the sternal border. Pulmonary/Chest: Effort normal and breath sounds normal. No respiratory distress. He has no wheezes, rhonchi or rales. Abdominal: Soft, non-tender. Bowel sounds and aorta are normal. He exhibits no organomegaly, mass or bruit. Extremities: Trace lower extremity edema. No cyanosis and no clubbing. Pulses are 2+ radial and carotid pulses. 2+ dorsalis pedis and posterior tibial pulses bilaterally. Neurological: He is alert and oriented to person, place, and time. No evidence of gross cranial nerve deficit. Coordination appeared normal.   Skin: Skin is warm and dry. There is no rash or diaphoresis. Psychiatric: He has a normal mood and affect. His speech is normal and behavior is normal.      MOST RECENT LABS ON RECORD:   Lab Results   Component Value Date    WBC 11.8 (H) 03/20/2018    HGB 12.6 (L) 06/13/2018    HCT 41.1 06/13/2018     03/20/2018     03/20/2018    K 4.9 03/20/2018    CL 96 (L) 03/20/2018    CREATININE 0.62 (L) 03/20/2018    BUN 13 03/20/2018    CO2 29 03/20/2018    INR 1.0 03/20/2018     ASSESSMENT:  1. Acute on chronic diastolic congestive heart failure ([facility] [address]    2. CAD, multiple vessel    3. Moderate to severe aortic stenosis    4. Chest pain, unspecified type       PLAN:  Chronic diastolic heart failure: New York Heart Association Class: IIa (Mild symptoms only in normal activities)   Beta Blocker: START Metoprolol succinate (Toprol XL) 50 mg daily.  ACE Inibitor/ARB: INCREASE to lisinopril 20 mg daily. I also discussed the potential side effects of this medication including lightheadedness and dizziness and instructed them to stop the medication of this occurs and call our office if this occurs.  Diuretics: Continue furosemide (Lasix) 20 mg as directed.    Heart failure counseling: I advised them to try and keep their legs up whenever possible and to limit salt

## 2019-04-02 NOTE — PATIENT INSTRUCTIONS
SURVEY:    You may be receiving a survey from [employer] regarding your visit today. Please complete the survey to enable us to provide the highest quality of care to you and your family. If you cannot score us a very good on any question, please call the office to discuss how we could have made your experience a very good one. Thank you.

## 2019-04-10 ENCOUNTER — HOSPITAL ENCOUNTER (OUTPATIENT)
Dept: NON INVASIVE DIAGNOSTICS | Age: 54
Discharge: HOME | End: 2019-04-10
Payer: MEDICARE

## 2019-04-10 DIAGNOSIS — I25.10 CAD, MULTIPLE VESSEL: ICD-10-CM

## 2019-04-10 DIAGNOSIS — I35.0 MODERATE TO SEVERE AORTIC STENOSIS: ICD-10-CM

## 2019-04-10 PROCEDURE — 93016 CV STRESS TEST SUPVJ ONLY: CPT | Performed by: FAMILY MEDICINE

## 2019-04-10 PROCEDURE — 78452 HT MUSCLE IMAGE SPECT MULT: CPT

## 2019-04-10 PROCEDURE — 3430000000 HC RX DIAGNOSTIC RADIOPHARMACEUTICAL: Performed by: FAMILY MEDICINE

## 2019-04-10 PROCEDURE — 93017 CV STRESS TEST TRACING ONLY: CPT

## 2019-04-10 PROCEDURE — A9500 TC99M SESTAMIBI: HCPCS | Performed by: FAMILY MEDICINE

## 2019-04-10 PROCEDURE — 93018 CV STRESS TEST I&R ONLY: CPT | Performed by: FAMILY MEDICINE

## 2019-04-10 PROCEDURE — 78452 HT MUSCLE IMAGE SPECT MULT: CPT | Performed by: FAMILY MEDICINE

## 2019-04-10 RX ADMIN — Medication 30 MILLICURIE: at 11:14

## 2019-04-10 RX ADMIN — Medication 10 MILLICURIE: at 10:07

## 2019-04-24 ENCOUNTER — TELEPHONE (OUTPATIENT)
Dept: CARDIOLOGY | Age: 54
End: 2019-04-24

## 2019-04-24 NOTE — TELEPHONE ENCOUNTER
----- Message from [place] Apodaca MD sent at 4/24/2019 12:21 PM EDT -----  Let Mr. Ruslan Noyola know their test result was ok. Thanks.

## 2019-04-24 NOTE — PROCEDURES
[address], [place] [address]                              CARDIAC STRESS TEST    PATIENT NAME: Agustin Cosby                    :        1965  MED REC NO:   [id number]                              ROOM:  ACCOUNT NO:   [de-identified]                           ADMIT DATE: 04/10/2019  PROVIDER:     Berry Dumont    CARDIOVASCULAR DIAGNOSTIC DEPARTMENT    DATE OF STUDY:  04/10/2019    ORDERING PROVIDER:  Berry Dumont MD    PRIMARY CARE PROVIDER:  Linda Ralph DO    INTERPRETING PHYSICIAN:  Berry Dumont MD    EXERCISE MYOCARDIAL PERFUSION STRESS TEST REPORT    Rest/Stress single-isotope SPECT imaging with exercise stress and gated  SPECT imaging    INDICATION:  Assessment of recent chest pain and/or discomfort. CLINICAL HISTORY:  The patient is a 64-year-old man with known coronary  artery disease. Previous cardiac history includes:  CAD, stress test, cardiac  catheterization, AVR, CABG. Other previous history includes:  Chest pain, palpitations, fatigue,  dyspnea, lightheadedness, hypertension. Symptoms just prior to testing included:  None. Relevant medications:  Metoprolol. PROCEDURE:  The patient performed treadmill exercise using a Alen  protocol, completing 4:26 minutes and completing an estimated workload  of 1.88 metabolic equivalents (METS). The test was terminated due to leg fatigue and shortness of breath. The heart rate was 79 beats per minute at baseline and increased to 142  beats per minute at peak exercise, which was 86% of the maximum  predicted heart rate. The rest blood pressure was 148/82 mmHg and  increased to 180/110 mmHg, which is a normal response. During the  procedure, the patient developed fatigue and, shortness of breath and  leg fatigue but denied any chest discomfort.     Myocardial perfusion imaging: Imaging was performed at rest 30-45  minutes following the injection of 10.0 mCi of sestamibi. At peak  exercise, the patient was injected with 30.0 mCi of sestamibi and  exercise was continued for 1 minute. Gating post-stress tomographic  imaging was performed 30-45 minutes after stress. STRESS ECG RESULTS:  The resting electrocardiogram demonstrated normal  sinus rhythm without definitive ST-segment abnormalities suggestive of  myocardial ischemia. At peak exercise and during recovery, the patient developed:    Upsloping ST segment changes in lead(s) II, III, AVF, V5 and V6 which  did not meet diagnostic criteria for myocardial ischemia with no  premature atrial contractions (PACs) and no premature ventricular  contractions (PVCs). NUCLEAR IMAGING RESULTS:  The overall quality of the study is good. Mild/moderate attenuation artifact was seen. There is no evidence of  abnormal lung uptake. Additionally, the right ventricle appears normal.  The left ventricular cavity is noted to be normal in size on the stress  images. There is no evidence of transient ischemic dilatation (TID) of  the left ventricle. Gated SPECT imaging reveals normal myocardial thickening and wall motion  with a calculated left ventricular ejection fraction of >60%. The rest images demonstrated a small/moderate perfusion abnormality of  mild intensity in the inferolateral, inferior region(s) which is most  likely due to artifact. On stress imaging, a small/moderate perfusion abnormality of mild  intensity was noted in the inferolateral, inferior region(s) which is  most likely due to artifact. IMPRESSION:  1. Most likely normal myocardial perfusion imaging with soft tissue  artifact, but without evidence of significant myocardial ischemia or  infarction. 2.  Global left ventricular systolic function was normal without  regional wall motion abnormalities.     3.  No significant electrocardiographic evidence of myocardial ischemia  during EKG monitoring without significant associated

## 2019-05-14 ENCOUNTER — OFFICE VISIT (OUTPATIENT)
Dept: CARDIOLOGY | Age: 54
End: 2019-05-14
Payer: MEDICARE

## 2019-05-14 VITALS
HEIGHT: 68 IN | RESPIRATION RATE: 16 BRPM | BODY MASS INDEX: 36.1 KG/M2 | WEIGHT: 238.2 LBS | OXYGEN SATURATION: 97 % | SYSTOLIC BLOOD PRESSURE: 173 MMHG | DIASTOLIC BLOOD PRESSURE: 95 MMHG | HEART RATE: 80 BPM

## 2019-05-14 DIAGNOSIS — I25.10 CAD, MULTIPLE VESSEL: ICD-10-CM

## 2019-05-14 DIAGNOSIS — I50.32 CHRONIC DIASTOLIC CHF (CONGESTIVE HEART FAILURE), NYHA CLASS 2 (HCC): Primary | ICD-10-CM

## 2019-05-14 DIAGNOSIS — Z86.79 HISTORY OF AORTIC STENOSIS: ICD-10-CM

## 2019-05-14 DIAGNOSIS — I10 ESSENTIAL HYPERTENSION: ICD-10-CM

## 2019-05-14 DIAGNOSIS — Z01.810 PREOP CARDIOVASCULAR EXAM: ICD-10-CM

## 2019-05-14 DIAGNOSIS — I10 HYPERTENSION, UNCONTROLLED: ICD-10-CM

## 2019-05-14 DIAGNOSIS — I20.9 ANGINA, CLASS III (HCC): ICD-10-CM

## 2019-05-14 PROCEDURE — G8598 ASA/ANTIPLAT THER USED: HCPCS | Performed by: FAMILY MEDICINE

## 2019-05-14 PROCEDURE — 99214 OFFICE O/P EST MOD 30 MIN: CPT | Performed by: FAMILY MEDICINE

## 2019-05-14 PROCEDURE — G8427 DOCREV CUR MEDS BY ELIG CLIN: HCPCS | Performed by: FAMILY MEDICINE

## 2019-05-14 PROCEDURE — 3017F COLORECTAL CA SCREEN DOC REV: CPT | Performed by: FAMILY MEDICINE

## 2019-05-14 PROCEDURE — G8417 CALC BMI ABV UP PARAM F/U: HCPCS | Performed by: FAMILY MEDICINE

## 2019-05-14 PROCEDURE — 4004F PT TOBACCO SCREEN RCVD TLK: CPT | Performed by: FAMILY MEDICINE

## 2019-05-14 RX ORDER — LISINOPRIL 40 MG/1
40 TABLET ORAL DAILY
Qty: 90 TABLET | Refills: 3 | Status: SHIPPED | OUTPATIENT
Start: 2019-05-14 | End: 2019-11-19

## 2019-05-14 NOTE — PROGRESS NOTES
Gene Vallejo am scribing for and in the presence of Juan Durham MD.    Patient: Km Soriano  : 1965  Date of Visit: May 14, 2019    REASON FOR VISIT / CONSULTATION: Follow-up (HX:CHF, CAD, CABG,AVR Pt is here for follow up he states he is doing well at this time other than fighting cold and cough. He does have occasional palptations Denies: CP, SOB, )      Dear Sherif Howe DO and Dr Loni Pedroza,    I had the pleasure of seeing your patient Km Soriano in consultation today. As you know, Mr. Denita Goyal is a 62 y.o. male who presents with a history of severe aortic stenosis. A pre pre-op heart catheterization also showed severe multi-vessel disease leading to a bioprosthetic AVR as well as a 2 vessel CABG and aortoplasty on 03/15/2018. Chest xray on 2018 shows intact sternal wires. Since the last time I saw Mr. Denita Goyal, he reports doing well. He denied any current or recent shortness of breath, abdominal pain, bleeding problems, problems with his medications or any other concerns at this time other than some upcoming dental work which he thinks he may need. He says that his blood pressure is also up and down ranging from 120 to 180's but usually around 150's. Past Medical History:   Diagnosis Date    Arthritis     CAD (coronary artery disease)     [facility]erebral artery occlusion with [facility] infarction [facility] [address]     CHF (congestive heart failure) (HCC)     COPD (chronic obstructive pulmonary disease) ([facility] [address]     H/O echocardiogram 2018    Normal LV . EF 55-60%. Severe concentic hypertrophy. Aortic stenosis. moderate. Peak gradient is 67 mmHg. Mean gredient is 37 mmHg. Cannot exclude bicuspid aortic valve. Moderate to severe aortic valve regurgitation.  H/O transesophageal echocardiography (LUTHER) for monitoring 2018    EF 65%. Significant left ventirucular hypertrophy was noted. The aortic valve was severly calcified and appered to be functionally bicuspid.  Severe aortic valve calcificatoniwith what apperas to be severe aotic stenosis. Aortic valve area by planimetary was 0.92 cm2. Based on what appears to be severe aortic stneosis, consider corrective aortic valve surgery if clinically indicated.  History of echocardiogram 2018    EF 60%. LV wall thickness is severely increased. Inferoseptal wall is abnormal in its motion which is not unusual s/p open heart. Sigmoid septum w/ some degree of subaortic LV septal hypertrophy. Bioprosthetic aortic valve is seen. Mitral annular calcification seen. Mild mitral regurg. Evidence of moderate (grade II) diastolic dysfunction seen.  History of stress test, lexiscan 04/10/2019    most likely normal myocardial perfusion imaging with soft tissue artifact, but w/o evidence of significant myocardial ischemia or infarction. global LV systolic function was normal w/o regional wall motion abnormalities. no significant electrocardiographic evidence of myocardial ischemia during EKG monitoring w/o significant associated arrhthmias. CURRENT ALLERGIES: Patient has no known allergies. REVIEW OF SYSTEMS: 14 systems were reviewed. Pertinent positives and negatives as above, all else negative.      Past Surgical History:   Procedure Laterality Date    CARDIAC CATHETERIZATION Left 03/15/2018    Right radial/ [facility] ([facility]) Wilfred/DR Lutz-severe mitral valve disease and multivessel coronary artery disease    [place] CABG, VEIN, SINGLE N/A 3/16/2018    CABG x 2 LIMA-LAD-DIAG,  CORONARY ARTERY BYPASS AORTIC VALVE REPLACEMENT WITH 23 MAGNA , AORTIC ANEURYSM REPLACEMENT WITH  28X30,SWAN DIANN, LUTHER performed by Juanita Munoz MD at [facility] [address] History:  Social History     Tobacco Use    Smoking status: Current Some Day Smoker     Packs/day: 0.05     Years: 40.00     Pack years: 2.00     Types: Cigarettes, Pipe, Cigars     Last attempt to quit: 3/14/2018     Years since quittin.1    Smokeless tobacco: Never Used    Tobacco comment: 1 pack every two weeks x2 years   Substance Use Topics    Alcohol use: No    Drug use: No        CURRENT MEDICATIONS:  Outpatient Medications Marked as Taking for the 5/14/19 encounter (Office Visit) with Karen Castro MD   Medication Sig Dispense Refill    aspirin 325 MG EC tablet Take 1 tablet by mouth every 6 hours as needed for Pain 30 tablet 3    lisinopril (PRINIVIL;ZESTRIL) 20 MG tablet Take 1 tablet by mouth daily 90 tablet 3    metoprolol succinate (TOPROL XL) 50 MG extended release tablet Take 1 tablet by mouth daily 90 tablet 3    furosemide (LASIX) 20 MG tablet take 1 tablet by mouth once daily 90 tablet 3    nitroGLYCERIN (NITROSTAT) 0.4 MG SL tablet Place 1 tablet under the tongue every 5 minutes as needed for Chest pain 25 tablet 3    atorvastatin (LIPITOR) 40 MG tablet Take 1 tablet by mouth nightly 30 tablet 3    famotidine (PEPCID) 20 MG tablet Take 1 tablet by mouth 2 times daily 60 tablet 3    albuterol (PROVENTIL) (2.5 MG/3ML) 0.083% nebulizer solution inhale contents of 1 vial in nebulizer every 4 hours if needed for wheezing  0    amitriptyline (ELAVIL) 50 MG tablet Take 50 mg by mouth daily  0    SYMBICORT 160-4.5 MCG/ACT AERO inhale 2 puffs by mouth once a day  0    montelukast (SINGULAIR) 10 MG tablet Take 10 mg by mouth daily He only takes when he feels like he needs it. 0    LYRICA 150 MG capsule Take 150 mg by mouth 3 times daily. 0    tiZANidine (ZANAFLEX) 4 MG tablet Take 4 mg by mouth 3 times daily  0       FAMILY HISTORY: Reviewed but non-contributory     PHYSICAL EXAM:   BP (!) 171/96 (Site: Right Upper Arm, Position: Sitting, Cuff Size: Medium Adult)   Pulse 88   Resp 16   Ht 5' 8\" (1.727 m)   Wt 238 lb 3.2 oz (108 kg)   SpO2 97%   BMI 36.22 kg/m²  Body mass index is 36.22 kg/m². Constitutional: He is oriented to person, place, and time. He appears well-developed and well-nourished. In no acute distress.    HEENT: Normocephalic and the medication of this occurs and call our office if this occurs.  Diuretics: Continue furosemide (Lasix) 20 mg as directed.  Heart failure counseling: I advised them to try and keep their legs up whenever possible and to limit salt in their diet.  Additional Testing List: None    Atherosclerotic Heart Disease: CABG x2 done on 3/16/2018   Antiplatelet Agent: Continue aspirin 325 mg daily . I also reminded him to watch for signs of blood in his stool or black tarry stools and stop the medication immediately if this develops as this could be life threatening. Beta Blocker Therapy: Continue Metoprolol succinate (Toprol XL)  50 mg      Statin Therapy: Continue atorvastatin (Lipitor) 40 mg nightly. Testing: None     History of Aortic Stenosis: Aortic Valve Replacement with aortoplasty: 3/16/2018  · Continue  mg daily. · Beta Blocker Therapy: Continue Metoprolol succinate (Toprol XL)     · ACE Inibitor/ARB: Increase lisinopril 40 mg     · Anticoagulation: Not indicated   · Diuretics: Continue furosemide (lasix) 20 mg   · Nonpharmacologic management of Heart Failure: I also advised him to try and keep his legs up whenever possible and try and limit salt in his diet. · Testing: None     · Class III angina: Stress test as above relatively normal 4/10/2019. · Continue Toprol XL 50 mg daily. Tobacco Abuse Counseling: I spent several minutes discussing the dangers of tobacco abuse as well as multiple methods for trying to quit smoking. In the end, Mr. Brenda Ndiaye said that he did not want any assistance at this time but would continue to try and quit reduce and eventually quit smoking in the near future. He does use a e-cigarette to help with quitting.  Essential Hypertension: Uncontrolled  · Beta Blocker: Continue Metoprolol succinate (Toprol XL) 50 mg daily. · ACE Inibitor/ARB: INCREASE to lisinopril 40 mg daily.  I also discussed the potential side effects of this medication including lightheadedness and dizziness and instructed them to stop the medication of this occurs and call our office if this occurs. · Diuretics: Continue furosemide (Lasix) 20 mg as directed. · Calcium Channel Blocker: Not indicated at this time. · Additional testing: Additional Testing List: None    · Pre-Op Clearance: low risk of perioperative cardiac complications related to any kind of basic dental surgery. · I do advise before any dental procedure he will need prophylactic antibiotics prior to procedure, typically penicillin. In the meantime I asked him to continue taking his other medications and follow up with you as previously scheduled. FOLLOW UP:   I told Mr. Buchanan Form to call my office if he had any problems, but otherwise told him to Return in about 6 months (around 11/14/2019). However, I would be happy to see him sooner should the need arise. Sincerely,  Wilbur Lutz MD, MS, F.A.C.C. [facility] Cardiology Specialist    [address] [place] [address]  Phone: [phone], Fax: [phone]     I believe that the risk of significant morbidity and mortality related to the patient's current medical conditions are: Intermediate. The documentation recorded by the scribe, accurately and completely reflects the services I personally performed and the decisions made by me. Neftaly Amin MD, MS, F.A.C.C.  May 14, 2019

## 2019-05-14 NOTE — PATIENT INSTRUCTIONS
SURVEY:    You may be receiving a survey from [employer] regarding your visit today. Please complete the survey to enable us to provide the highest quality of care to you and your family. If you cannot score us a very good on any question, please call the office to discuss how we could have made your experience a very good one. Thank you.

## 2019-06-28 NOTE — SEDATION DOCUMENTATION
Reason for Disposition   [1] MILD weakness (i.e., does not interfere with ability to work, go to school, normal activities) AND [2] persists > 1 week    Protocols used: WEAKNESS (GENERALIZED) AND FATIGUE-A-AH       Bite blocked placed per resp.

## 2019-11-19 ENCOUNTER — OFFICE VISIT (OUTPATIENT)
Dept: CARDIOLOGY | Age: 54
End: 2019-11-19
Payer: MEDICARE

## 2019-11-19 VITALS
OXYGEN SATURATION: 96 % | SYSTOLIC BLOOD PRESSURE: 132 MMHG | WEIGHT: 243 LBS | HEART RATE: 91 BPM | BODY MASS INDEX: 36.83 KG/M2 | RESPIRATION RATE: 18 BRPM | HEIGHT: 68 IN | DIASTOLIC BLOOD PRESSURE: 78 MMHG

## 2019-11-19 DIAGNOSIS — Z95.1 S/P CABG X 2: ICD-10-CM

## 2019-11-19 DIAGNOSIS — Z71.6 TOBACCO ABUSE COUNSELING: ICD-10-CM

## 2019-11-19 DIAGNOSIS — Z86.79 HISTORY OF AORTIC STENOSIS: ICD-10-CM

## 2019-11-19 DIAGNOSIS — I25.10 CAD, MULTIPLE VESSEL: ICD-10-CM

## 2019-11-19 DIAGNOSIS — I10 ESSENTIAL HYPERTENSION: ICD-10-CM

## 2019-11-19 DIAGNOSIS — N52.9 ERECTILE DYSFUNCTION, UNSPECIFIED ERECTILE DYSFUNCTION TYPE: ICD-10-CM

## 2019-11-19 DIAGNOSIS — I50.32 CHRONIC DIASTOLIC CHF (CONGESTIVE HEART FAILURE), NYHA CLASS 2 (HCC): Primary | ICD-10-CM

## 2019-11-19 DIAGNOSIS — I20.9 ANGINA, CLASS III (HCC): ICD-10-CM

## 2019-11-19 PROCEDURE — 4004F PT TOBACCO SCREEN RCVD TLK: CPT | Performed by: FAMILY MEDICINE

## 2019-11-19 PROCEDURE — G8484 FLU IMMUNIZE NO ADMIN: HCPCS | Performed by: FAMILY MEDICINE

## 2019-11-19 PROCEDURE — G8417 CALC BMI ABV UP PARAM F/U: HCPCS | Performed by: FAMILY MEDICINE

## 2019-11-19 PROCEDURE — G8598 ASA/ANTIPLAT THER USED: HCPCS | Performed by: FAMILY MEDICINE

## 2019-11-19 PROCEDURE — G8427 DOCREV CUR MEDS BY ELIG CLIN: HCPCS | Performed by: FAMILY MEDICINE

## 2019-11-19 PROCEDURE — 99214 OFFICE O/P EST MOD 30 MIN: CPT | Performed by: FAMILY MEDICINE

## 2019-11-19 PROCEDURE — 3017F COLORECTAL CA SCREEN DOC REV: CPT | Performed by: FAMILY MEDICINE

## 2019-11-19 RX ORDER — SILDENAFIL 100 MG/1
100 TABLET, FILM COATED ORAL DAILY PRN
Qty: 30 TABLET | Refills: 3 | Status: SHIPPED | OUTPATIENT
Start: 2019-11-19

## 2019-11-19 RX ORDER — ASPIRIN 325 MG
325 TABLET, DELAYED RELEASE (ENTERIC COATED) ORAL DAILY
Qty: 30 TABLET | Refills: 3
Start: 2019-11-19 | End: 2021-11-17

## 2019-11-19 RX ORDER — LISINOPRIL 20 MG/1
20 TABLET ORAL DAILY
Qty: 90 TABLET | Refills: 3 | Status: SHIPPED | OUTPATIENT
Start: 2019-11-19 | End: 2020-09-09

## 2019-11-19 RX ORDER — METOPROLOL SUCCINATE 25 MG/1
25 TABLET, EXTENDED RELEASE ORAL DAILY
Qty: 90 TABLET | Refills: 3 | Status: CANCELLED | OUTPATIENT
Start: 2019-11-19

## 2020-02-10 RX ORDER — FUROSEMIDE 20 MG/1
TABLET ORAL
Qty: 90 TABLET | Refills: 3 | Status: SHIPPED | OUTPATIENT
Start: 2020-02-10 | End: 2020-03-02

## 2020-03-02 RX ORDER — FUROSEMIDE 20 MG/1
TABLET ORAL
Qty: 90 TABLET | Refills: 3 | Status: SHIPPED | OUTPATIENT
Start: 2020-03-02 | End: 2021-03-03

## 2020-09-09 RX ORDER — NITROGLYCERIN 0.4 MG/1
TABLET SUBLINGUAL
Qty: 25 TABLET | Refills: 3 | Status: SHIPPED | OUTPATIENT
Start: 2020-09-09

## 2020-09-09 RX ORDER — LISINOPRIL 20 MG/1
TABLET ORAL
Qty: 90 TABLET | Refills: 3 | Status: SHIPPED | OUTPATIENT
Start: 2020-09-09

## 2020-11-23 ENCOUNTER — OFFICE VISIT (OUTPATIENT)
Dept: CARDIOLOGY | Age: 55
End: 2020-11-23
Payer: MEDICARE

## 2020-11-23 VITALS
SYSTOLIC BLOOD PRESSURE: 139 MMHG | OXYGEN SATURATION: 97 % | RESPIRATION RATE: 18 BRPM | WEIGHT: 254.2 LBS | DIASTOLIC BLOOD PRESSURE: 80 MMHG | HEIGHT: 68 IN | BODY MASS INDEX: 38.52 KG/M2 | HEART RATE: 92 BPM

## 2020-11-23 PROCEDURE — 93000 ELECTROCARDIOGRAM COMPLETE: CPT | Performed by: FAMILY MEDICINE

## 2020-11-23 PROCEDURE — G8484 FLU IMMUNIZE NO ADMIN: HCPCS | Performed by: FAMILY MEDICINE

## 2020-11-23 PROCEDURE — G8427 DOCREV CUR MEDS BY ELIG CLIN: HCPCS | Performed by: FAMILY MEDICINE

## 2020-11-23 PROCEDURE — 4004F PT TOBACCO SCREEN RCVD TLK: CPT | Performed by: FAMILY MEDICINE

## 2020-11-23 PROCEDURE — 3017F COLORECTAL CA SCREEN DOC REV: CPT | Performed by: FAMILY MEDICINE

## 2020-11-23 PROCEDURE — 99214 OFFICE O/P EST MOD 30 MIN: CPT | Performed by: FAMILY MEDICINE

## 2020-11-23 PROCEDURE — G8417 CALC BMI ABV UP PARAM F/U: HCPCS | Performed by: FAMILY MEDICINE

## 2020-11-23 RX ORDER — OMEPRAZOLE 40 MG/1
40 CAPSULE, DELAYED RELEASE ORAL DAILY
COMMUNITY
Start: 2020-11-17

## 2020-11-23 NOTE — PROGRESS NOTES
transesophageal echocardiography ([facility]) for monitoring 03/20/2018    EF 65%. Significant left ventirucular hypertrophy was noted. The aortic valve was severly calcified and appered to be functionally bicuspid. Severe aortic valve calcificatoniwith what apperas to be severe aotic stenosis. Aortic valve area by planimetary was 0.92 cm2. Based on what appears to be severe aortic stneosis, consider corrective aortic valve surgery if clinically indicated.  History of echocardiogram 12/21/2018    EF 60%. LV wall thickness is severely increased. Inferoseptal wall is abnormal in its motion which is not unusual s/p open heart. Sigmoid septum w/ some degree of subaortic LV septal hypertrophy. Bioprosthetic aortic valve is seen. Mitral annular calcification seen. Mild mitral regurg. Evidence of moderate (grade II) diastolic dysfunction seen.  History of stress test, lexiscan 04/10/2019    most likely normal myocardial perfusion imaging with soft tissue artifact, but w/o evidence of significant myocardial ischemia or infarction. global LV systolic function was normal w/o regional wall motion abnormalities. no significant electrocardiographic evidence of myocardial ischemia during EKG monitoring w/o significant associated arrhthmias.  Hypertension, uncontrolled 5/14/2019       CURRENT ALLERGIES: Patient has no known allergies. REVIEW OF SYSTEMS: 14 systems were reviewed. Pertinent positives and negatives as above, all else negative.      Past Surgical History:   Procedure Laterality Date    CARDIAC CATHETERIZATION Left 03/15/2018    [facility] radial/ [facility]) Wilfred/DR Lutz-severe mitral valve disease and multivessel coronary artery disease    [place] CABG, VEIN, SINGLE N/A 3/16/2018    CABG x 2 LIMA-LAD-DIAG,  CORONARY ARTERY BYPASS AORTIC VALVE REPLACEMENT WITH 23 MAGNA , AORTIC ANEURYSM REPLACEMENT WITH  28X30,SWAN DIANN, LUTHER performed by Hayley Pandya MD at [facility] [address] History:  Social History     Tobacco Use    Smoking status: Current Some Day Smoker     Packs/day: 0.05     Years: 40.00     Pack years: 2.00     Types: Cigarettes, Pipe, Cigars     Last attempt to quit: 3/14/2018     Years since quittin.6    Smokeless tobacco: Never Used    Tobacco comment: 1 pack every two weeks x2 years   Substance Use Topics    Alcohol use: No    Drug use: No        CURRENT MEDICATIONS:  Outpatient Medications Marked as Taking for the 20 encounter (Office Visit) with Catrina Youngblood MD   Medication Sig Dispense Refill    omeprazole (PRILOSEC) 40 MG delayed release capsule Take 40 mg by mouth daily      nitroGLYCERIN (NITROSTAT) 0.4 MG SL tablet place 1 tablet under the tongue every 5 minutes if needed for chest pain 25 tablet 3    lisinopril (PRINIVIL;ZESTRIL) 20 MG tablet take 1 tablet by mouth once daily 90 tablet 3    furosemide (LASIX) 20 MG tablet take 1 tablet by mouth once daily 90 tablet 3    aspirin 325 MG EC tablet Take 1 tablet by mouth daily 30 tablet 3    sildenafil (VIAGRA) 100 MG tablet Take 1 tablet by mouth daily as needed for Erectile Dysfunction 30 tablet 3    atorvastatin (LIPITOR) 40 MG tablet Take 1 tablet by mouth nightly 30 tablet 3    albuterol (PROVENTIL) (2.5 MG/3ML) 0.083% nebulizer solution inhale contents of 1 vial in nebulizer every 4 hours if needed for wheezing  0    amitriptyline (ELAVIL) 50 MG tablet Take 50 mg by mouth daily  0    SYMBICORT 160-4.5 MCG/ACT AERO inhale 2 puffs by mouth once a day  0    montelukast (SINGULAIR) 10 MG tablet Take 10 mg by mouth daily He only takes when he feels like he needs it. 0    LYRICA 150 MG capsule Take 150 mg by mouth 3 times daily.   0    tiZANidine (ZANAFLEX) 4 MG tablet Take 4 mg by mouth 3 times daily  0       FAMILY HISTORY: Reviewed but non-contributory     PHYSICAL EXAM:   /80 (Site: Left Upper Arm, Position: Sitting, Cuff Size: Medium Adult)   Pulse 92   Resp 18   Ht 5' 7.52\" (1.715 m)   Wt 254 symptoms only in normal activities)   Beta Blocker: Not indicated at this time. Due to side effects.  ACE Inibitor/ARB: Continue to lisinopril 20 mg daily. I also discussed the potential side effects of this medication including lightheadedness and dizziness and instructed them to stop the medication of this occurs and call our office if this occurs.  Diuretics: Continue furosemide (Lasix) 20 mg as directed.  Heart failure counseling: I advised them to try and keep their legs up whenever possible and to limit salt in their diet.  Additional Testing List: None    Atherosclerotic Heart Disease: CABG x2 done on 3/16/2018   Antiplatelet Agent: Continue aspirin 325 mg daily . I also reminded him to watch for signs of blood in his stool or black tarry stools and stop the medication immediately if this develops as this could be life threatening. Statin Therapy: Continue atorvastatin (Lipitor) 40 mg nightly. History of Aortic Stenosis: Aortic Valve Replacement with aortoplasty: 3/16/2018  · Continue  mg daily. · ACE Inibitor/ARB: Continue lisinopril 20 mg     · Anticoagulation: Not indicated   · Diuretics: Continue furosemide (lasix) 20 mg   · Nonpharmacologic management of Heart Failure: I also advised him to try and keep his legs up whenever possible and try and limit salt in his diet. · Testing: I Ordered an Echocardiogram to assess Mr. Kevin Alvarez ejection fraction and to look for significant valvular heart disease as a source of Mr. Trino Lynn symptoms     · Class III angina: Stress test as above relatively normal 4/10/2019. · Not indicated. · Tobacco Abuse Counseling: I spent several minutes discussing the dangers of tobacco abuse as well as multiple methods for trying to quit smoking. In the end, Mr. Trino Lynn said that he did not want any assistance at this time but would continue to try and quit reduce and eventually quit smoking in the near future.   He says he is going to set a goal and cut back on his own over the next few weeks. He says he skips days at a time between smoking.  Essential Hypertension: Controlled  · Beta Blocker: Not indicated at this time. · ACE Inibitor/ARB: Continue lisinopril 20 mg daily. · Diuretics: Continue furosemide (Lasix) 20 mg as directed. In the meantime I asked him to continue taking his other medications and follow up with you as previously scheduled. FOLLOW UP:   I told Mr. Govind Crawford to call my office if he had any problems, but otherwise told him to Return in about 1 year (around 11/23/2021). However, I would be happy to see him sooner should the need arise. Sincerely,  Champ Parisi. Bolivar DE LEON, MS, F.A.C.C. [facility] Cardiology Specialist     [address]  Phone: [phone], Fax: [phone]     I believe that the risk of significant morbidity and mortality related to the patient's current medical conditions are: Intermediate. The documentation recorded by the scribe, accurately and completely reflects the services I personally performed and the decisions made by me. Hossein Person MD, MS, F.A.C.C.  November 23, 2020

## 2020-11-23 NOTE — PATIENT INSTRUCTIONS
SURVEY:    You may be receiving a survey from [employer] regarding your visit today. Please complete the survey to enable us to provide the highest quality of care to you and your family. If you cannot score us a very good on any question, please call the office to discuss how we could have made your experience a very good one. Thank you.

## 2020-12-03 ENCOUNTER — HOSPITAL ENCOUNTER (OUTPATIENT)
Dept: NON INVASIVE DIAGNOSTICS | Age: 55
Discharge: HOME | End: 2020-12-03
Payer: MEDICARE

## 2020-12-03 LAB
LV EF: 60 %
LVEF MODALITY: NORMAL

## 2020-12-03 PROCEDURE — 93306 TTE W/DOPPLER COMPLETE: CPT

## 2020-12-04 ENCOUNTER — TELEPHONE (OUTPATIENT)
Dept: CARDIOLOGY | Age: 55
End: 2020-12-04

## 2021-03-03 DIAGNOSIS — I50.32 CHRONIC DIASTOLIC CHF (CONGESTIVE HEART FAILURE), NYHA CLASS 2 (HCC): Primary | ICD-10-CM

## 2021-03-03 RX ORDER — FUROSEMIDE 20 MG/1
TABLET ORAL
Qty: 90 TABLET | Refills: 3 | Status: SHIPPED | OUTPATIENT
Start: 2021-03-03 | End: 2022-02-21

## 2021-06-09 ENCOUNTER — TELEPHONE (OUTPATIENT)
Dept: CARDIOLOGY | Age: 56
End: 2021-06-09

## 2021-06-10 NOTE — TELEPHONE ENCOUNTER
Please call Mr. Annmarie Ramirez in order to arrange an appointment ASAP. Today of possible. Thanks.

## 2021-09-29 ENCOUNTER — OFFICE VISIT (OUTPATIENT)
Dept: SURGERY | Age: 56
End: 2021-09-29
Payer: MEDICARE

## 2021-09-29 VITALS
SYSTOLIC BLOOD PRESSURE: 182 MMHG | WEIGHT: 254 LBS | HEIGHT: 67 IN | HEART RATE: 86 BPM | TEMPERATURE: 98 F | BODY MASS INDEX: 39.87 KG/M2 | DIASTOLIC BLOOD PRESSURE: 81 MMHG

## 2021-09-29 DIAGNOSIS — K42.9 UMBILICAL HERNIA WITHOUT OBSTRUCTION AND WITHOUT GANGRENE: Primary | ICD-10-CM

## 2021-09-29 DIAGNOSIS — R10.30 LOWER ABDOMINAL PAIN: ICD-10-CM

## 2021-09-29 PROCEDURE — 4004F PT TOBACCO SCREEN RCVD TLK: CPT | Performed by: SURGERY

## 2021-09-29 PROCEDURE — G8417 CALC BMI ABV UP PARAM F/U: HCPCS | Performed by: SURGERY

## 2021-09-29 PROCEDURE — G8427 DOCREV CUR MEDS BY ELIG CLIN: HCPCS | Performed by: SURGERY

## 2021-09-29 PROCEDURE — 3017F COLORECTAL CA SCREEN DOC REV: CPT | Performed by: SURGERY

## 2021-09-29 PROCEDURE — 99203 OFFICE O/P NEW LOW 30 MIN: CPT | Performed by: SURGERY

## 2021-09-29 NOTE — PROGRESS NOTES
GENERAL SURGERY CONSULTATION      Patient's Name/ Date of Birth/ Gender: Jed Watkins / 1965 (60 y.o.) / male     PCP: Denise Boateng DO  Referring:     History of present Illness:  Patient is a pleasant 60 y.o. male  kindly referred by Denise Boateng DO   His son is known to me, prior surgical patient, works at [employer]. Patient has a large, chronic true umbilical hernia/no prior surgeries. It has been present for decades and is getting increasingly symptomatic/painful, intermittently incarcerated where he has to reduce it manually and has had difficulty with it when straining. He denies constipation. He has a BMI of 39.7 he is a smoker about a pack a week. Some intermittent nausea. No fevers or chills. Past Medical History:  has a past medical history of Arthritis, CAD (coronary artery disease), Cerebral artery occlusion with cerebral infarction ([facility] [address] CHF (congestive heart failure) ([facility] [address] COPD (chronic obstructive pulmonary disease) ([facility] [address] H/O echocardiogram, H/O transesophageal echocardiography (LUTHER) for monitoring, History of echocardiogram, History of stress test, lexiscan, Hypertension, uncontrolled, Obesity (BMI 30-39.9), and Smoker. Past Surgical History:   Past Surgical History:   Procedure Laterality Date    AORTIC VALVE REPLACEMENT      bioprosthetic valve    CARDIAC CATHETERIZATION Left 03/15/2018    Right radial/ [facility] ([facility] Wilfred/DR Lutz-severe mitral valve disease and multivessel coronary artery disease    [place] CABG, VEIN, SINGLE N/A 03/16/2018    CABG x 2 LIMA-LAD-DIAG,  CORONARY ARTERY BYPASS AORTIC VALVE REPLACEMENT WITH 23 MAGNA , AORTIC ANEURYSM REPLACEMENT WITH  28X30,SWAN DAINN, LUTHER performed by Silva Verdugo MD at [place] [address] History:  reports that he has been smoking cigarettes, pipe, and cigars. He has a 2.00 pack-year smoking history.  He has never used smokeless tobacco. He reports that he does not drink alcohol and does not use drugs. Family History: family history is not on file. Review of Systems:   General: Completed and, except as mentioned above, was negative or noncontributory  Psychological:  Completed and, except as mentioned above, was negative or noncontributory  Ophthalmic:  Completed and, except as mentioned above, was negative or noncontributory  ENT:  Completed and, except as mentioned above, was negative or noncontributory  Allergy and Immunology:  Completed and, except as mentioned above, was negative or noncontributory  Hematological and Lymphatic:  Completed and, except as mentioned above, was negative or noncontributory  Endocrine: Completed and, except as mentioned above, was negative or noncontributory  Breast:  Completed and, except as mentioned above, was negative or noncontributory  Respiratory:  Completed and, except as mentioned above, was negative or noncontributory  Cardiovascular:  Completed and, except as mentioned above, was negative or noncontributory  Gastrointestinal: Completed and, except as mentioned above, was negative or noncontributory  Genito-Urinary:  Completed and, except as mentioned above, was negative or noncontributory  Musculoskeletal:  Completed and, except as mentioned above, was negative or noncontributory  Neurological:  Completed and, except as mentioned above, was negative or noncontributory  Dermatological:  Completed and, except as mentioned above, was negative or noncontributory    Allergies: Patient has no known allergies.     Current Meds:  Current Outpatient Medications:     furosemide (LASIX) 20 MG tablet, take 1 tablet by mouth once daily, Disp: 90 tablet, Rfl: 3    omeprazole (PRILOSEC) 40 MG delayed release capsule, Take 40 mg by mouth daily, Disp: , Rfl:     lisinopril (PRINIVIL;ZESTRIL) 20 MG tablet, take 1 tablet by mouth once daily, Disp: 90 tablet, Rfl: 3    aspirin 325 MG EC tablet, Take 1 tablet by mouth daily, Disp: 30 tablet, Rfl: 3    atorvastatin (LIPITOR) 40 MG tablet, Take 1 tablet by mouth nightly, Disp: 30 tablet, Rfl: 3    albuterol (PROVENTIL) (2.5 MG/3ML) 0.083% nebulizer solution, inhale contents of 1 vial in nebulizer every 4 hours if needed for wheezing, Disp: , Rfl: 0    amitriptyline (ELAVIL) 50 MG tablet, Take 100 mg by mouth daily , Disp: , Rfl: 0    montelukast (SINGULAIR) 10 MG tablet, Take 10 mg by mouth daily He only takes when he feels like he needs it., Disp: , Rfl: 0    LYRICA 150 MG capsule, Take 150 mg by mouth 3 times daily. , Disp: , Rfl: 0    tiZANidine (ZANAFLEX) 4 MG tablet, Take 4 mg by mouth 3 times daily, Disp: , Rfl: 0    nitroGLYCERIN (NITROSTAT) 0.4 MG SL tablet, place 1 tablet under the tongue every 5 minutes if needed for chest pain, Disp: 25 tablet, Rfl: 3    sildenafil (VIAGRA) 100 MG tablet, Take 1 tablet by mouth daily as needed for Erectile Dysfunction, Disp: 30 tablet, Rfl: 3    SYMBICORT 160-4.5 MCG/ACT AERO, inhale 2 puffs by mouth once a day (Patient not taking: Reported on 9/29/2021), Disp: , Rfl: 0    Physical Exam:  Vital signs and Nurse's note reviewed. BP (!) 182/81   Pulse 86   Temp 98 °F (36.7 °C)   Ht 5' 7\" (1.702 m)   Wt 254 lb (115.2 kg)   BMI 39.78 kg/m²    height is 5' 7\" (1.702 m) and weight is 254 lb (115.2 kg). His temperature is 98 °F (36.7 °C). His blood pressure is 182/81 (abnormal) and his pulse is 86. Gen:  A&Ox3, NAD. Pleasant and cooperative. HEENT: PERRLA, EOMI, no scleral icterus  Neck:  no goiter  CVS: Regular rate and rhythm  Resp: Good bilateral air entry, no active wheezing, no labored breathing  Abd: soft, obese, chronic reducible umbilical hernia fascial defect 4 cm containing bowel and omentum, overlying skin is attenuated, mildly chronically ischemic, no cellulitis  Ext: Moves all extremities, no gross focal motor deficits. He uses a cane to walk  Skin: No erythema or ulcerations.  Prior well healed CABG incision    Labs:   Lab Results   Component Value Date    WBC 11.8 03/20/2018    HGB 12.6 06/13/2018    HCT 41.1 06/13/2018    MCV 96.9 03/20/2018     03/20/2018     Lab Results   Component Value Date     03/20/2018    K 4.9 03/20/2018    CL 96 03/20/2018    CO2 29 03/20/2018    BUN 13 03/20/2018    CREATININE 0.62 03/20/2018    GLUCOSE 106 03/20/2018    CALCIUM 8.2 03/20/2018     No results found for: ALKPHOS, ALT, AST, PROT, BILITOT, BILIDIR, LABALBU  No results found for: AMYLASE  No results found for: LIPASE  Lab Results   Component Value Date    INR 1.0 03/20/2018       Radiologic Studies:  EXAMINATION:   TWO VIEWS OF THE CHEST       12/21/2018 10:46 am       COMPARISON:   June 13, 2018       HISTORY:   ORDERING SYSTEM PROVIDED HISTORY: Coronary artery disease involving coronary   bypass graft of native heart without angina pectoris   TECHNOLOGIST PROVIDED HISTORY:   Other       FINDINGS:   Right upper lobe scarring.  No focal consolidation.       Sternotomy.  Aortic valve replacement.  Cardiomegaly.  No pulmonary edema.       Moderate thoracic spine degenerative changes.           Impression   No acute findings.           Impressions/Recommendations:     Symptomatic large umbilical hernia containing bowel/omentum, reducible today, has intermittent incarcerated symptoms. Detailed discussion with patient. Given his medical history, smoking status, symptoms, BMI, cardiac history- I think an open repair with possible umbilectomy and mesh repair would be safest. I told him he is at increased risk of recurrence with his BMI and smoking. He is at risk for bowel compromise and therefore we will proceed. He acknowledges the risks, asks intelligent questions. Recovery time discussed in detail. Thank you Kenan Cole DO for allowing me to participate in the care of your patients.      Katelyn Pardo DO, MPH, [address] [place] [address] Rd office [phone]  [place] office [phone]

## 2021-11-10 ENCOUNTER — OFFICE VISIT (OUTPATIENT)
Dept: CARDIOLOGY | Age: 56
End: 2021-11-10
Payer: MEDICARE

## 2021-11-10 VITALS
DIASTOLIC BLOOD PRESSURE: 74 MMHG | BODY MASS INDEX: 37.35 KG/M2 | HEART RATE: 116 BPM | SYSTOLIC BLOOD PRESSURE: 130 MMHG | OXYGEN SATURATION: 95 % | RESPIRATION RATE: 18 BRPM | HEIGHT: 68 IN | WEIGHT: 246.4 LBS

## 2021-11-10 DIAGNOSIS — Z01.818 PREOPERATIVE CLEARANCE: ICD-10-CM

## 2021-11-10 DIAGNOSIS — Z86.79 HISTORY OF AORTIC STENOSIS: ICD-10-CM

## 2021-11-10 DIAGNOSIS — Z95.1 S/P CABG X 2: ICD-10-CM

## 2021-11-10 DIAGNOSIS — I50.32 CHRONIC DIASTOLIC CHF (CONGESTIVE HEART FAILURE), NYHA CLASS 2 (HCC): Primary | ICD-10-CM

## 2021-11-10 DIAGNOSIS — Z71.6 TOBACCO ABUSE COUNSELING: ICD-10-CM

## 2021-11-10 DIAGNOSIS — I20.9 ANGINA, CLASS III (HCC): ICD-10-CM

## 2021-11-10 DIAGNOSIS — I25.10 CAD, MULTIPLE VESSEL: ICD-10-CM

## 2021-11-10 PROCEDURE — 99214 OFFICE O/P EST MOD 30 MIN: CPT | Performed by: FAMILY MEDICINE

## 2021-11-10 PROCEDURE — G8427 DOCREV CUR MEDS BY ELIG CLIN: HCPCS | Performed by: FAMILY MEDICINE

## 2021-11-10 PROCEDURE — G8484 FLU IMMUNIZE NO ADMIN: HCPCS | Performed by: FAMILY MEDICINE

## 2021-11-10 PROCEDURE — 93000 ELECTROCARDIOGRAM COMPLETE: CPT | Performed by: FAMILY MEDICINE

## 2021-11-10 PROCEDURE — 3017F COLORECTAL CA SCREEN DOC REV: CPT | Performed by: FAMILY MEDICINE

## 2021-11-10 PROCEDURE — 4004F PT TOBACCO SCREEN RCVD TLK: CPT | Performed by: FAMILY MEDICINE

## 2021-11-10 PROCEDURE — G8417 CALC BMI ABV UP PARAM F/U: HCPCS | Performed by: FAMILY MEDICINE

## 2021-11-10 RX ORDER — DILTIAZEM HYDROCHLORIDE 120 MG/1
120 CAPSULE, COATED, EXTENDED RELEASE ORAL DAILY
Qty: 90 CAPSULE | Refills: 3 | Status: SHIPPED | OUTPATIENT
Start: 2021-11-10

## 2021-11-10 RX ORDER — EZETIMIBE 10 MG/1
10 TABLET ORAL DAILY
Qty: 90 TABLET | Refills: 3 | Status: SHIPPED | OUTPATIENT
Start: 2021-11-10 | End: 2022-10-25

## 2021-11-10 NOTE — PATIENT INSTRUCTIONS
SURVEY:    You may be receiving a survey from [employer] regarding your visit today. Please complete the survey to enable us to provide the highest quality of care to you and your family. If you cannot score us a very good on any question, please call the office to discuss how we could have made your experience a very good one. Thank you.

## 2021-11-10 NOTE — PROGRESS NOTES
Dioni Ceuvas am scribing for and in the presence of Adonis Lutz MD, MS, F.A.C.C..    Patient: Callum Mcclain  : 1965  Date of Visit: November 10, 2021    REASON FOR VISIT / CONSULTATION: Cardiac Clearance (EKG done today. HX:CHF, CAD s/p CABG, aortic stenosis, angina, HTN, tobacco Pt is here for cardiac clearance to have hernia repaired. He does take Nitro very occasionally when he is unsure if he is having CP or muscle pain, SOB,lightheaded/dizziness denies:palp )    Dear Sathish Brown DO and Dr Sergo Villalpando,    I had the pleasure of seeing your patient Callum Mcclain in consultation today. As you know, Mr. Joselyn Coronado is a 60 y.o. male who presents with a history of severe aortic stenosis. A pre pre-op heart catheterization also showed severe multi-vessel disease leading to a bioprosthetic AVR as well as a 2 vessel CABG and aortoplasty on 03/15/2018. Chest xray on 2018 shows intact sternal wires. Since the last time I saw Mr. Joselyn Coronado, he reports he is having hernia repair on  with Dr Jae Merino. He reports having mild dizziness all the time. He said usually when he gets up too quick. No falls or near falls. He says he has gained weight and admits he is not able to walk very far, probably less than 1 block due to hip and knee pain as well as shortness of breath with exertion. He denied any abdominal pain, bleeding problems, problems with his medications or any other concerns at this time. He denies any chest pain, pressure or tightness. He denies any palpitations. Exercise Tolerance: Mr. Joselyn Coronado reports that he has an excellent exercise tolerance. His says that he could walk about one block without having to stop due to pain in his back, hips, knees and shortness of breath.       Past Medical History:   Diagnosis Date    Arthritis     CAD (coronary artery disease)     Cerebral artery occlusion with cerebral infarction ([facility] [address]     CHF (congestive heart failure) (HCC)     COPD (chronic obstructive pulmonary disease) ([facility] [address]     H/O echocardiogram 02/23/2018    Normal LV . EF 55-60%. Severe concentic hypertrophy. Aortic stenosis. moderate. Peak gradient is 67 mmHg. Mean gredient is 37 mmHg. Cannot exclude bicuspid aortic valve. Moderate to severe aortic valve regurgitation.  H/O transesophageal echocardiography (LUTHER) for monitoring 03/20/2018    EF 65%. Significant left ventirucular hypertrophy was noted. The aortic valve was severly calcified and appered to be functionally bicuspid. Severe aortic valve calcificatoniwith what apperas to be severe aotic stenosis. Aortic valve area by planimetary was 0.92 cm2. Based on what appears to be severe aortic stneosis, consider corrective aortic valve surgery if clinically indicated.  History of echocardiogram 12/21/2018    EF 60%. LV wall thickness is severely increased. Inferoseptal wall is abnormal in its motion which is not unusual s/p open heart. Sigmoid septum w/ some degree of subaortic LV septal hypertrophy. Bioprosthetic aortic valve is seen. Mitral annular calcification seen. Mild mitral regurg. Evidence of moderate (grade II) diastolic dysfunction seen.  History of stress test, lexiscan 04/10/2019    most likely normal myocardial perfusion imaging with soft tissue artifact, but w/o evidence of significant myocardial ischemia or infarction. global LV systolic function was normal w/o regional wall motion abnormalities. no significant electrocardiographic evidence of myocardial ischemia during EKG monitoring w/o significant associated arrhthmias.  Hypertension, uncontrolled 05/14/2019    Obesity (BMI 30-39. 9)     Smoker        CURRENT ALLERGIES: Patient has no known allergies. REVIEW OF SYSTEMS: 14 systems were reviewed. Pertinent positives and negatives as above, all else negative.      Past Surgical History:   Procedure Laterality Date    AORTIC VALVE REPLACEMENT      bioprosthetic valve    CARDIAC CATHETERIZATION Left 03/15/2018 Right radial/ [facility] ([facility]) Wilfred/DR Lutz-severe mitral valve disease and multivessel coronary artery disease    [place] CABG, VEIN, SINGLE N/A 03/16/2018    CABG x 2 LIMA-LAD-DIAG,  CORONARY ARTERY BYPASS AORTIC VALVE REPLACEMENT WITH 23 MAGNA , AORTIC ANEURYSM REPLACEMENT WITH  28X30,ELIANE TIDWELL, LUTHER performed by Suresh Menjivar MD at [facility] [address] History:  Social History     Tobacco Use    Smoking status: Current Some Day Smoker     Packs/day: 0.05     Years: 40.00     Pack years: 2.00     Types: Cigarettes, Pipe, Cigars     Last attempt to quit: 3/14/2018     Years since quitting: 3.6    Smokeless tobacco: Never Used    Tobacco comment: 1 pack every two weeks x2 years   Vaping Use    Vaping Use: Never used   Substance Use Topics    Alcohol use: Yes    Drug use: No        CURRENT MEDICATIONS:  Outpatient Medications Marked as Taking for the 11/10/21 encounter (Office Visit) with Netta Leyden, MD   Medication Sig Dispense Refill    furosemide (LASIX) 20 MG tablet take 1 tablet by mouth once daily 90 tablet 3    nitroGLYCERIN (NITROSTAT) 0.4 MG SL tablet place 1 tablet under the tongue every 5 minutes if needed for chest pain 25 tablet 3    lisinopril (PRINIVIL;ZESTRIL) 20 MG tablet take 1 tablet by mouth once daily 90 tablet 3    aspirin 325 MG EC tablet Take 1 tablet by mouth daily 30 tablet 3    sildenafil (VIAGRA) 100 MG tablet Take 1 tablet by mouth daily as needed for Erectile Dysfunction 30 tablet 3    atorvastatin (LIPITOR) 40 MG tablet Take 1 tablet by mouth nightly 30 tablet 3    albuterol (PROVENTIL) (2.5 MG/3ML) 0.083% nebulizer solution inhale contents of 1 vial in nebulizer every 4 hours if needed for wheezing  0    amitriptyline (ELAVIL) 50 MG tablet Take 100 mg by mouth daily   0    montelukast (SINGULAIR) 10 MG tablet Take 10 mg by mouth daily He only takes when he feels like he needs it. 0    LYRICA 150 MG capsule Take 150 mg by mouth 3 times daily.   0    tiZANidine (ZANAFLEX) 4 MG tablet Take 4 mg by mouth 3 times daily  0       FAMILY HISTORY: Reviewed but non-contributory     PHYSICAL EXAM:   Resp 18   Ht 5' 8\" (1.727 m)   Wt 246 lb 6.4 oz (111.8 kg)   BMI 37.46 kg/m²  Body mass index is 37.46 kg/m². Constitutional: He is oriented to person, place, and time. He appears well-developed and well-nourished. In no acute distress. HEENT: Normocephalic and atraumatic. No JVD present. Carotid bruit is not present. No mass and no thyromegaly present. No lymphadenopathy present. Cardiovascular: Normal rate, regular rhythm, normal heart sounds. Exam reveals no gallop and no friction rubs. 2/6 systolic murmur, 2nd intercostal space on the RIGHT just lateral to the sternum. Pulmonary/Chest: Effort normal and breath sounds normal. No respiratory distress. He has no wheezes, rhonchi or rales. Abdominal: Soft, non-tender. Bowel sounds and aorta are normal. He exhibits no organomegaly, mass or bruit. Extremities: Trace lower extremity edema. No cyanosis and no clubbing. Pulses are 2+ radial and carotid pulses. 2+ dorsalis pedis and posterior tibial pulses bilaterally. Neurological: He is alert and oriented to person, place, and time. No evidence of gross cranial nerve deficit. Coordination appeared normal.   Skin: Skin is warm and dry. There is no rash or diaphoresis. Psychiatric: He has a normal mood and affect. His speech is normal and behavior is normal.      MOST RECENT LABS ON RECORD:   Lab Results   Component Value Date    WBC 11.8 (H) 03/20/2018    HGB 12.6 (L) 06/13/2018    HCT 41.1 06/13/2018     03/20/2018     03/20/2018    K 4.9 03/20/2018    CL 96 (L) 03/20/2018    CREATININE 0.62 (L) 03/20/2018    BUN 13 03/20/2018    CO2 29 03/20/2018    INR 1.0 03/20/2018     ASSESSMENT:  1. Chronic diastolic CHF (congestive heart failure), NYHA class 2 ([facility] [address]    2. CAD, multiple vessel    3. S/P CABG x 2    4. History of aortic stenosis    5.  Angina, class III ([place] [address]    6. Tobacco abuse counseling    7. Preoperative clearance       PLAN:    · Pre-Op Clearance:   · Pre-Operative Risk assessment using 2014 ACC/AHA guidelines   · Emergent procedure No  · Active Cardiac Condition No (decompensated HF, Arrhythmia, MI <3 weeks, severe valve disease)  · Risk Level of Procedure Intermediate Risk (intraperitoneal, intrathoracic, HENT, orthopedic, or carotid endarterectomy, etc.)  · Revised Cardiac Risk Index Risk factors: History of ischemic heart disease  · History of heart failure  · Measurement of Exercise Tolerance before Surgery >4 No  · According to the 2014 ACC/AHA pre-operative risk assessment guidelines Pilar España is at an unclear risk for major cardiac complications during a intermediate risk procedure and so will need to have a stress test as below. Based on these results I will make further recommendations. Specific medication recommendations are listed below. Medications recommended to continue should be taken with a sip of water even when NPO.  Medical management to reduce perioperative risk:   Antiplatelet Agent: Ok to STOP aspirin 5-7 days prior to surgery   Anticoagulant Agent: Not applicable prior to the procedure.  Anticoagulation Bridging: Not applicable   Additional Recommendations: I would also suggest that he continue his statin throughout the perioperative period.  Additional Testing List: Because current signs and symptoms can certainly be caused by significant coronary artery disease, I ordered a Regadenoson (Lexiscan) stress test with SPECT imaging to try and rule out this possibility. · Chronic diastolic heart failure: New York Heart Association Class: IIa (Mild symptoms only in normal activities)   Beta Blocker: Not indicated at this time. Due to side effects.  ACE Inibitor/ARB: Continue to lisinopril 20 mg daily.  I also discussed the potential side effects of this medication including lightheadedness and dizziness and instructed them to stop the medication of this occurs and call our office if this occurs.  Diuretics: Continue furosemide (Lasix) 20 mg as directed.  Heart failure counseling: I advised them to try and keep their legs up whenever possible and to limit salt in their diet.  Additional Testing List: None    Atherosclerotic Heart Disease: CABG x2 done on 3/16/2018   Antiplatelet Agent: Continue aspirin 325 mg daily . I also reminded him to watch for signs of blood in his stool or black tarry stools and stop the medication immediately if this develops as this could be life threatening. Statin Therapy: Continue atorvastatin (Lipitor) 40 mg nightly. History of Aortic Stenosis: Aortic Valve Replacement with aortoplasty: 3/16/2018  · Continue  mg daily. · ACE Inibitor/ARB: Continue lisinopril 20 mg     · Anticoagulation: Not indicated   · Diuretics: Continue furosemide (lasix) 20 mg   · Nonpharmacologic management of Heart Failure: I also advised him to try and keep his legs up whenever possible and try and limit salt in his diet. · Testing: None     · Class III angina: Stress test as above relatively normal 4/10/2019. · Not indicated. · Tobacco Abuse Counseling: I spent several minutes discussing the dangers of tobacco abuse as well as multiple methods for trying to quit smoking. In the end, Mr. Yolette Garcia said that he did not want any assistance at this time but would continue to try and quit reduce and eventually quit smoking in the near future. He says he is going to set a goal and cut back on his own over the next few weeks. He says he skips days at a time between smoking.  Essential Hypertension: Controlled  · Beta Blocker: Not indicated at this time. · ACE Inibitor/ARB: Continue lisinopril 20 mg daily. · Diuretics: Continue furosemide (Lasix) 20 mg as directed.     In the meantime I asked him to continue taking his other medications and follow up with you as previously scheduled. FOLLOW UP:   I told Mr. Khurram Finney to call my office if he had any problems, but otherwise told him to No follow-ups on file. However, I would be happy to see him sooner should the need arise. Sincerely,  Moreno Lutz MD, MS, F.A.C.C. [facility] Cardiology Specialist    [address] [place] [address], [address] [place] [address]  Phone: [phone], Fax: [phone]     I believe that the risk of significant morbidity and mortality related to the patient's current medical conditions are: Intermediate. The documentation recorded by the scribe, accurately and completely reflects the services I personally performed and the decisions made by me. Chrystal De La Garza MD, MS, F.A.C.C.  November 10, 2021

## 2021-11-12 ENCOUNTER — HOSPITAL ENCOUNTER (OUTPATIENT)
Dept: NON INVASIVE DIAGNOSTICS | Age: 56
Discharge: HOME | End: 2021-11-12
Payer: MEDICARE

## 2021-11-12 ENCOUNTER — TELEPHONE (OUTPATIENT)
Dept: SURGERY | Age: 56
End: 2021-11-12

## 2021-11-12 DIAGNOSIS — Z86.79 HISTORY OF AORTIC STENOSIS: ICD-10-CM

## 2021-11-12 DIAGNOSIS — Z71.6 TOBACCO ABUSE COUNSELING: ICD-10-CM

## 2021-11-12 DIAGNOSIS — Z95.1 S/P CABG X 2: ICD-10-CM

## 2021-11-12 DIAGNOSIS — I50.32 CHRONIC DIASTOLIC CHF (CONGESTIVE HEART FAILURE), NYHA CLASS 2 (HCC): ICD-10-CM

## 2021-11-12 DIAGNOSIS — I20.9 ANGINA, CLASS III (HCC): ICD-10-CM

## 2021-11-12 DIAGNOSIS — I25.10 CAD, MULTIPLE VESSEL: ICD-10-CM

## 2021-11-12 PROCEDURE — A9500 TC99M SESTAMIBI: HCPCS | Performed by: FAMILY MEDICINE

## 2021-11-12 PROCEDURE — 93017 CV STRESS TEST TRACING ONLY: CPT

## 2021-11-12 PROCEDURE — 6360000002 HC RX W HCPCS: Performed by: FAMILY MEDICINE

## 2021-11-12 PROCEDURE — 3430000000 HC RX DIAGNOSTIC RADIOPHARMACEUTICAL: Performed by: FAMILY MEDICINE

## 2021-11-12 RX ADMIN — Medication 30 MILLICURIE: at 09:29

## 2021-11-12 RX ADMIN — REGADENOSON 0.4 MG: 0.08 INJECTION, SOLUTION INTRAVENOUS at 09:29

## 2021-11-12 NOTE — TELEPHONE ENCOUNTER
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 Mitch Weeks. Is it ok to stop his aspirin prior?

## 2021-11-15 ENCOUNTER — TELEPHONE (OUTPATIENT)
Dept: CARDIOLOGY | Age: 56
End: 2021-11-15

## 2021-11-15 ENCOUNTER — HOSPITAL ENCOUNTER (OUTPATIENT)
Dept: NON INVASIVE DIAGNOSTICS | Age: 56
Discharge: HOME | End: 2021-11-15
Payer: MEDICARE

## 2021-11-15 PROCEDURE — 78452 HT MUSCLE IMAGE SPECT MULT: CPT

## 2021-11-15 PROCEDURE — 3430000000 HC RX DIAGNOSTIC RADIOPHARMACEUTICAL: Performed by: FAMILY MEDICINE

## 2021-11-15 PROCEDURE — A9500 TC99M SESTAMIBI: HCPCS | Performed by: FAMILY MEDICINE

## 2021-11-15 RX ADMIN — Medication 30 MILLICURIE: at 13:35

## 2021-11-16 ENCOUNTER — TELEPHONE (OUTPATIENT)
Dept: CARDIOLOGY | Age: 56
End: 2021-11-16

## 2021-11-16 NOTE — PROCEDURES
[address]                              CARDIAC STRESS TEST    PATIENT NAME: Behzad Vallejo                    :        1965  MED REC NO:   [id number]                              ROOM:  ACCOUNT NO:   [de-identified]                           ADMIT DATE: 2021  PROVIDER:     Christos Mckay MD    CARDIOVASCULAR DIAGNOSTIC DEPARTMENT    DATE OF STUDY:  2021    ORDERING PROVIDER:  Christos Mckay MD    PRIMARY CARE PROVIDER:  Yanely Tompkins. DO Ellen    INTERPRETING PHYSICIAN:  Christos Mckay MD    PHARMACOLOGIC MYOCARDIAL PERFUSION STRESS TESTING    Stress/Rest single isotope SPECT imaging with exercise stress and gated  SPECT imaging. INDICATIONS:  Diagnosis of coronary disease. Assessment of recent chest pain and/or chest discomfort. Assessment of a cardiac cause:  Congestive heart failure. CLINICAL HISTORY:  The patient is a 58-year-old man with known coronary  artery disease. Previous cardiac history includes:  Coronary artery disease, stress  test, cardiac catheterization, coronary artery bypass graft. Other previous history includes:  Chest pain, palpitations, fatigue,  dyspnea, CVA, lightheadedness, indigestion, heartburn, hypertension,  smoker. Symptoms just prior to testing include:  None. Relevant medications:  Diltiazem. PROCEDURE:  The heart rate was 89 at baseline and kolby to 111 beats per  minute during the regadenoson infusion. The rest blood pressure was  118/60 mm/Hg and increased to 122/70 mm/Hg. Pharmacologic stress testing was performed with regadenoson at a dose of  0.4 mg. Additionally, low level exercise using slow treadmill walking  was performed along with vasodilator infusion. MYOCARDIAL PERFUSION IMAGING:  Imaging was performed at rest 30-45  minutes following the injection of 30 mCi of sestamibi.   Approximately  10 seconds after Lexiscan injection, the patient was injected with 30  mCi of sestamibi. Gating post-stress tomographic imaging was performed  30-45 minutes after stress. STRESS ECG RESULTS:  The resting electrocardiogram demonstrated normal  sinus rhythm without significant ST-segment abnormalities that may  impair accurate ECG detection of stress induced cardiac ischemia. During vasodilator infusion and during recovery, the patient developed:    No significant ST segment changes suggestive of myocardial ischemia with  no premature atrial contractions (PACs) and no premature ventricular  contractions (PVCs). NUCLEAR IMAGING RESULTS:  The overall quality of the study is good. Mild to moderate attenuation artifact was seen. There is no evidence of  abnormal lung uptake. Additionally, the right ventricle appears normal.  The left ventricular cavity is noted to be normal in size on the stress  images. There is evidence of transient ischemic dilatation (TID) of the  left ventricle. Gated SPECT imaging reveals normal myocardial thickening and wall motion  with a calculated left ventricular ejection fraction of 73%. The rest images demonstrated a small perfusion abnormality of mild  intensity in the inferolateral region which is most likely due to  artifact. On stress imaging, a small perfusion abnormality of mild to moderate  intensity in the inferolateral and inferior regions which is most likely  due to artifact. IMPRESSION:  1. Equivocal myocardial perfusion study. There is a small perfusion  defect of mild to moderate intensity in the inferolateral and inferior  regions during stress imaging which is most consistent with ischemia,  but may be due to artifact. In addition, transient ischemic dilatation (TID) of the left ventricle  is seen which can be seen with uncontrolled hypertension, severe left  main coronary artery disease, or severe 3-vessel coronary artery  disease. (TID: 1.36)    2.   Global left ventricular systolic function was normal, without  regional wall motion abnormalities. 3.  No significant electrocardiographic evidence of myocardial ischemia  during EKG monitoring without significant associated arrhythmias. Overall, these results are most consistent with an intermediate to high  risk for significant coronary artery disease. Depending on the patient's symptoms and level of clinical suspicion,  aggressive medical management versus additional testing by coronary  angiography may be indicated. The sensitivity for detecting ischemia on this test may have been  reduced due to the patient being on a calcium channel blocker.         Andrei Houston MD    D: 11/16/2021 7:39:32       T: 11/16/2021 7:42:02     [email or web address]  Job#: [id number]     Doc#: Unknown    CC:  Sydney Mcgee

## 2021-11-16 NOTE — TELEPHONE ENCOUNTER
----- Message from Emmanuelle Figueredo MD sent at 11/16/2021  3:40 PM EST -----  Please let Mr. Sade Bello know that his test was abnormal and please make them an appointment in 1-2 weeks if not already done. Thanks.     [place]

## 2021-11-17 ENCOUNTER — OFFICE VISIT (OUTPATIENT)
Dept: CARDIOLOGY | Age: 56
End: 2021-11-17
Payer: MEDICARE

## 2021-11-17 ENCOUNTER — TELEPHONE (OUTPATIENT)
Dept: SURGERY | Age: 56
End: 2021-11-17

## 2021-11-17 ENCOUNTER — HOSPITAL ENCOUNTER (OUTPATIENT)
Age: 56
Discharge: HOME | End: 2021-11-17
Payer: MEDICARE

## 2021-11-17 VITALS
WEIGHT: 246 LBS | BODY MASS INDEX: 37.28 KG/M2 | SYSTOLIC BLOOD PRESSURE: 132 MMHG | RESPIRATION RATE: 18 BRPM | HEIGHT: 68 IN | HEART RATE: 93 BPM | OXYGEN SATURATION: 96 % | DIASTOLIC BLOOD PRESSURE: 77 MMHG

## 2021-11-17 DIAGNOSIS — F32.89 OTHER DEPRESSION: ICD-10-CM

## 2021-11-17 DIAGNOSIS — I20.9 ANGINA, CLASS III (HCC): ICD-10-CM

## 2021-11-17 DIAGNOSIS — I25.10 CAD, MULTIPLE VESSEL: ICD-10-CM

## 2021-11-17 DIAGNOSIS — Z71.6 TOBACCO ABUSE COUNSELING: ICD-10-CM

## 2021-11-17 DIAGNOSIS — Z95.1 S/P CABG X 2: ICD-10-CM

## 2021-11-17 DIAGNOSIS — R94.39 ABNORMAL STRESS TEST: ICD-10-CM

## 2021-11-17 DIAGNOSIS — I10 ESSENTIAL HYPERTENSION: ICD-10-CM

## 2021-11-17 DIAGNOSIS — Z86.79 HISTORY OF AORTIC STENOSIS: ICD-10-CM

## 2021-11-17 DIAGNOSIS — R94.39 ABNORMAL STRESS TEST: Primary | ICD-10-CM

## 2021-11-17 DIAGNOSIS — I50.32 CHRONIC DIASTOLIC CHF (CONGESTIVE HEART FAILURE), NYHA CLASS 2 (HCC): ICD-10-CM

## 2021-11-17 LAB
ANION GAP SERPL CALCULATED.3IONS-SCNC: 11 MMOL/L (ref 9–17)
BUN BLDV-MCNC: 13 MG/DL (ref 6–20)
BUN/CREAT BLD: 16 (ref 9–20)
CALCIUM SERPL-MCNC: 9.6 MG/DL (ref 8.6–10.4)
CHLORIDE BLD-SCNC: 98 MMOL/L (ref 98–107)
CO2: 27 MMOL/L (ref 20–31)
CREAT SERPL-MCNC: 0.8 MG/DL (ref 0.7–1.2)
GFR AFRICAN AMERICAN: >60 ML/MIN
GFR NON-AFRICAN AMERICAN: >60 ML/MIN
GFR SERPL CREATININE-BSD FRML MDRD: ABNORMAL ML/MIN/{1.73_M2}
GFR SERPL CREATININE-BSD FRML MDRD: ABNORMAL ML/MIN/{1.73_M2}
GLUCOSE BLD-MCNC: 128 MG/DL (ref 70–99)
HCT VFR BLD CALC: 47.7 % (ref 40.7–50.3)
HEMOGLOBIN: 15.2 G/DL (ref 13–17)
MCH RBC QN AUTO: 29.8 PG (ref 25.2–33.5)
MCHC RBC AUTO-ENTMCNC: 31.9 G/DL (ref 28.4–34.8)
MCV RBC AUTO: 93.5 FL (ref 82.6–102.9)
NRBC AUTOMATED: 0 PER 100 WBC
PDW BLD-RTO: 14 % (ref 11.8–14.4)
PLATELET # BLD: 388 K/UL (ref 138–453)
PMV BLD AUTO: 9.7 FL (ref 8.1–13.5)
POTASSIUM SERPL-SCNC: 4.4 MMOL/L (ref 3.7–5.3)
RBC # BLD: 5.1 M/UL (ref 4.21–5.77)
SODIUM BLD-SCNC: 136 MMOL/L (ref 135–144)
WBC # BLD: 11.7 K/UL (ref 3.5–11.3)

## 2021-11-17 PROCEDURE — 4004F PT TOBACCO SCREEN RCVD TLK: CPT | Performed by: FAMILY MEDICINE

## 2021-11-17 PROCEDURE — G8417 CALC BMI ABV UP PARAM F/U: HCPCS | Performed by: FAMILY MEDICINE

## 2021-11-17 PROCEDURE — G8484 FLU IMMUNIZE NO ADMIN: HCPCS | Performed by: FAMILY MEDICINE

## 2021-11-17 PROCEDURE — 85027 COMPLETE CBC AUTOMATED: CPT

## 2021-11-17 PROCEDURE — 80048 BASIC METABOLIC PNL TOTAL CA: CPT

## 2021-11-17 PROCEDURE — 36415 COLL VENOUS BLD VENIPUNCTURE: CPT

## 2021-11-17 PROCEDURE — 99215 OFFICE O/P EST HI 40 MIN: CPT | Performed by: FAMILY MEDICINE

## 2021-11-17 PROCEDURE — 3017F COLORECTAL CA SCREEN DOC REV: CPT | Performed by: FAMILY MEDICINE

## 2021-11-17 PROCEDURE — G8427 DOCREV CUR MEDS BY ELIG CLIN: HCPCS | Performed by: FAMILY MEDICINE

## 2021-11-17 RX ORDER — DILTIAZEM HYDROCHLORIDE 180 MG/1
180 CAPSULE, COATED, EXTENDED RELEASE ORAL DAILY
Qty: 90 CAPSULE | Refills: 3 | Status: CANCELLED | OUTPATIENT
Start: 2021-11-17

## 2021-11-17 RX ORDER — VARENICLINE TARTRATE
KIT
Qty: 1 BOX | Refills: 0 | Status: SHIPPED | OUTPATIENT
Start: 2021-11-17

## 2021-11-17 NOTE — TELEPHONE ENCOUNTER
Writer spoke to Laxmi Gillespie to inform him that anesthesia is putting his surgery on hold until we can obtain cardiac clearance. His surgery for 11/18/2021 is cancelled at this time. Patient voiced understanding.

## 2021-11-17 NOTE — PROGRESS NOTES
Blake Li am scribing for and in the presence of Alessandra Matias. [place]ivar DE LEON, MS, F.A.C.C. Patient: Kristan Weller  : 1965  Date of Visit: 2021    REASON FOR VISIT / CONSULTATION: Follow-up (Hx: CHF, CAD, S/P CABG x2, Hx of Aortic Stenosis,Angina,Tobacco. Abn Stress on . Pt is here for follow up for abnormal stress. He still is sob, CP, lightheaded/dizziness,palp)    Dear Parviz Galvan DO ,    I had the pleasure of seeing your patient Kristan Weller in consultation today. As you know, Mr. Melvin Camejo is a 60 y.o. male who presents with a history of severe aortic stenosis. A pre pre-op heart catheterization also showed severe multi-vessel disease leading to a bioprosthetic AVR as well as a 2 vessel CABG and aortoplasty on 03/15/2018. Chest xray on 2018 shows intact sternal wires. He had stress test done on 2021 that was Equivocal. There is small perfusion defect of mild to moderate intensity in the inferolateral and inferior regions, also TID 1.36. Since the last time I saw Mr. Melvin Camejo, he had stress test done that was abnormal.  He reports having mild dizziness all the time. He said usually when he gets up too quick. No falls or near falls. He says he has gained weight and admits he is not able to walk very far, probably less than 1 block due to hip and knee pain as well as shortness of breath with exertion. He is depressed due to everything going on with his health but his biggest complaint is his arthritis pain and shortness of breath with exertion. He denied any abdominal pain, bleeding problems, problems with his medications or any other concerns at this time. He denies any chest pain, pressure or tightness. He denies any palpitations.     Past Medical History:   Diagnosis Date    Arthritis     CAD (coronary artery disease)     [facility]erebral artery occlusion with [facility] infarction [facility] [address]     CHF (congestive heart failure) (HCC)     COPD (chronic obstructive pulmonary disease) ([address]     H/O echocardiogram 02/23/2018    Normal LV . EF 55-60%. Severe concentic hypertrophy. Aortic stenosis. moderate. Peak gradient is 67 mmHg. Mean gredient is 37 mmHg. Cannot exclude bicuspid aortic valve. Moderate to severe aortic valve regurgitation.  H/O transesophageal echocardiography (LUTHER) for monitoring 03/20/2018    EF 65%. Significant left ventirucular hypertrophy was noted. The aortic valve was severly calcified and appered to be functionally bicuspid. Severe aortic valve calcificatoniwith what apperas to be severe aotic stenosis. Aortic valve area by planimetary was 0.92 cm2. Based on what appears to be severe aortic stneosis, consider corrective aortic valve surgery if clinically indicated.  History of echocardiogram 12/21/2018    EF 60%. LV wall thickness is severely increased. Inferoseptal wall is abnormal in its motion which is not unusual s/p open heart. Sigmoid septum w/ some degree of subaortic LV septal hypertrophy. Bioprosthetic aortic valve is seen. Mitral annular calcification seen. Mild mitral regurg. Evidence of moderate (grade II) diastolic dysfunction seen.  History of stress test, lexiscan 04/10/2019    most likely normal myocardial perfusion imaging with soft tissue artifact, but w/o evidence of significant myocardial ischemia or infarction. global LV systolic function was normal w/o regional wall motion abnormalities. no significant electrocardiographic evidence of myocardial ischemia during EKG monitoring w/o significant associated arrhthmias.  Hypertension, uncontrolled 05/14/2019    Obesity (BMI 30-39. 9)     Smoker        CURRENT ALLERGIES: Patient has no known allergies. REVIEW OF SYSTEMS: 14 systems were reviewed. Pertinent positives and negatives as above, all else negative.      Past Surgical History:   Procedure Laterality Date    AORTIC VALVE REPLACEMENT      bioprosthetic valve    CARDIAC CATHETERIZATION Left 03/15/2018    Right radial/ [id number] [facility] Wilfred/DR Lutz-severe mitral valve disease and multivessel coronary artery disease    UT CABG, VEIN, SINGLE N/A 03/16/2018    CABG x 2 LIMA-LAD-DIAG,  CORONARY ARTERY BYPASS AORTIC VALVE REPLACEMENT WITH 23 MAGNA , AORTIC ANEURYSM REPLACEMENT WITH  28X30,ELIANE TIDWELL, LUTHER performed by Ally Brandt MD at [facility] [address] History:  Social History     Tobacco Use    Smoking status: Current Some Day Smoker     Packs/day: 0.05     Years: 40.00     Pack years: 2.00     Types: Cigarettes, Pipe, Cigars     Last attempt to quit: 3/14/2018     Years since quitting: 3.6    Smokeless tobacco: Never Used    Tobacco comment: 1 pack every two weeks x2 years   Vaping Use    Vaping Use: Never used   Substance Use Topics    Alcohol use:  Yes    Drug use: No        CURRENT MEDICATIONS:  Outpatient Medications Marked as Taking for the 11/17/21 encounter (Office Visit) with Ghanshyam Enriquez MD   Medication Sig Dispense Refill    ezetimibe (ZETIA) 10 MG tablet Take 1 tablet by mouth daily 90 tablet 3    dilTIAZem (CARDIZEM CD) 120 MG extended release capsule Take 1 capsule by mouth daily 90 capsule 3    furosemide (LASIX) 20 MG tablet take 1 tablet by mouth once daily 90 tablet 3    omeprazole (PRILOSEC) 40 MG delayed release capsule Take 40 mg by mouth daily       nitroGLYCERIN (NITROSTAT) 0.4 MG SL tablet place 1 tablet under the tongue every 5 minutes if needed for chest pain 25 tablet 3    lisinopril (PRINIVIL;ZESTRIL) 20 MG tablet take 1 tablet by mouth once daily 90 tablet 3    sildenafil (VIAGRA) 100 MG tablet Take 1 tablet by mouth daily as needed for Erectile Dysfunction 30 tablet 3    atorvastatin (LIPITOR) 40 MG tablet Take 1 tablet by mouth nightly 30 tablet 3    albuterol (PROVENTIL) (2.5 MG/3ML) 0.083% nebulizer solution inhale contents of 1 vial in nebulizer every 4 hours if needed for wheezing  0    amitriptyline (ELAVIL) 50 MG tablet Take 100 mg by mouth daily   0    SYMBICORT 160-4.5 MCG/ACT AERO inhale 2 puffs by mouth once a day  0    montelukast (SINGULAIR) 10 MG tablet Take 10 mg by mouth daily He only takes when he feels like he needs it. 0    LYRICA 150 MG capsule Take 150 mg by mouth 3 times daily. 0    tiZANidine (ZANAFLEX) 4 MG tablet Take 4 mg by mouth 3 times daily  0       FAMILY HISTORY: Reviewed but non-contributory     PHYSICAL EXAM:   /71 (Site: Right Upper Arm, Position: Sitting, Cuff Size: Large Adult)   Pulse 109   Resp 18   Ht 5' 8\" (1.727 m)   Wt 246 lb (111.6 kg)   SpO2 96%   BMI 37.40 kg/m²  Body mass index is 37.4 kg/m². Constitutional: He is oriented to person, place, and time. He appears well-developed and well-nourished. In no acute distress. HEENT: Normocephalic and atraumatic. No JVD present. Carotid bruit is not present. No mass and no thyromegaly present. No lymphadenopathy present. Cardiovascular: Tachycardic rate, regular rhythm, normal heart sounds. Exam reveals no gallop and no friction rubs. 3/6 systolic murmur, 5th intercostal space on the LEFT in the mid-clavicular line (cardiac apex). Pulmonary/Chest: Effort normal and breath sounds normal. No respiratory distress. He has no wheezes, rhonchi or rales. Abdominal: Soft, non-tender. Bowel sounds and aorta are normal. He exhibits no organomegaly, mass or bruit. Extremities: Trace lower extremity edema. No cyanosis and no clubbing. Pulses are 2+ radial and carotid pulses. 2+ dorsalis pedis and posterior tibial pulses bilaterally. Neurological: He is alert and oriented to person, place, and time. No evidence of gross cranial nerve deficit. Coordination appeared normal.   Skin: Skin is warm and dry. There is no rash or diaphoresis. Psychiatric: He has a normal mood and affect.  His speech is normal and behavior is normal.      MOST RECENT LABS ON RECORD:   Lab Results   Component Value Date    WBC 11.8 (H) 03/20/2018    HGB 12.6 (L) 06/13/2018    HCT 41.1 06/13/2018     03/20/2018     03/20/2018    K 4.9 03/20/2018    CL 96 (L) 03/20/2018    CREATININE 0.62 (L) 03/20/2018    BUN 13 03/20/2018    CO2 29 03/20/2018    INR 1.0 03/20/2018     ASSESSMENT:  1. Abnormal stress test    2. Chronic diastolic CHF (congestive heart failure), NYHA class 2 ([address]    3. CAD, multiple vessel    4. S/P CABG x 2    5. History of aortic stenosis    6. Angina, class III ([address]    7. Tobacco abuse counseling    8. Essential hypertension    9. Other depression       PLAN:   Abnormal Stress Test: High risk due to transient ischemic dilatation of the left ventricle which has been associated with severe three vessel coronary artery disease,     For these reasons, I recommended that the patient consider undergoing a cardiac catheterization with coronary angiography to assess their coronary anatomy and facilitate better treatment recommendations. I discussed the risks, benefits, and alternatives to the procedure including the risk of bleeding, contrast induced allergy and/or kidney damage, arrythmia, stroke, heart attack, death, or the need for further procedures. I also discussed the fact that although treatment with simple medical management is a potential treatment option in place of cardiac catheterization, I expressed my opinion that cardiac catheterization in order to define their coronary anatomy and rule out severe 3 vessel or left main coronary artery disease would significant help guide the most appropriate treatment strategy ranging from no treatment, to medications, stents, to even coronary bypass surgery. The patient verbalized understanding of the risks benefits and alternatives and stated that they would like to undergo the procedure. We will plan to schedule the procedure here at [facility] on 11/22/2021 .  Medical Management for suspected Coronary artery disease and myocardial ischemia:   Antiplatelet Agent: Continue Aspirin 325 mg daily.     Beta Blocker: Not indicated at this time.  Anti-anginal medications: Continue nitroglycerin 0.4 mg tablets as needed for chest pain.  Cholesterol Reduction Therapy: Continue Atorvastatin (Lipitor) 40 mg daily. and Zetia 10 mg daily    Additional Testing List: I took the liberty of ordering a BMP for today to assess their potassium and renal function. I told them that they could get their lab work performed at the location of their choosing, unfortunately, if the lab work was not performed at a [facility]) facility I could not guarantee my ability to follow up with them on their results. and  I took the liberty of ordering a CBC. I told them that they could get their lab work performed at the location of their choosing, unfortunately, if the lab work was not performed at a [facility]) facility I could not guarantee my ability to follow up with them on their results. · Chronic diastolic heart failure: New [place] Heart Association Class: IIa (Mild symptoms only in normal activities)   Beta Blocker: Not indicated at this time. Due to side effects.  ACE Inibitor/ARB: Continue to lisinopril 20 mg daily. I also discussed the potential side effects of this medication including lightheadedness and dizziness and instructed them to stop the medication of this occurs and call our office if this occurs.  Diuretics: Continue furosemide (Lasix) 20 mg as directed.  Heart failure counseling: I advised them to try and keep their legs up whenever possible and to limit salt in their diet.  Additional Testing List: None    Atherosclerotic Heart Disease: CABG x2 done on 3/16/2018   Antiplatelet Agent: Start aspirin 81 mg daily. I also reminded him to watch for signs of blood in his stool or black tarry stools and stop the medication immediately if this develops as this could be life threatening. Statin Therapy: Continue atorvastatin (Lipitor) 40 mg nightly. and Zetia 10 mg daily.       History of Aortic Stenosis: Aortic Valve me. Guillermina Quezada MD, MS, F.A.C.C.  November 17, 2021

## 2021-11-18 RX ORDER — ASPIRIN 81 MG/1
81 TABLET ORAL DAILY
Qty: 90 TABLET | Refills: 3 | Status: SHIPPED | OUTPATIENT
Start: 2021-11-18

## 2021-12-10 PROBLEM — Z01.818 PREOPERATIVE CLEARANCE: Status: RESOLVED | Noted: 2021-11-10 | Resolved: 2021-12-10

## 2022-02-21 DIAGNOSIS — I50.32 CHRONIC DIASTOLIC CHF (CONGESTIVE HEART FAILURE), NYHA CLASS 2 (HCC): ICD-10-CM

## 2022-02-21 RX ORDER — FUROSEMIDE 20 MG/1
TABLET ORAL
Qty: 90 TABLET | Refills: 3 | Status: SHIPPED | OUTPATIENT
Start: 2022-02-21

## 2022-10-25 RX ORDER — EZETIMIBE 10 MG/1
TABLET ORAL
Qty: 90 TABLET | Refills: 3 | Status: SHIPPED | OUTPATIENT
Start: 2022-10-25

## 2022-10-31 RX ORDER — DILTIAZEM HYDROCHLORIDE 120 MG/1
CAPSULE, COATED, EXTENDED RELEASE ORAL
Qty: 90 CAPSULE | Refills: 3 | OUTPATIENT
Start: 2022-10-31

## 2022-11-21 RX ORDER — ASPIRIN 81 MG/1
TABLET, COATED ORAL
Qty: 90 TABLET | Refills: 3 | Status: SHIPPED | OUTPATIENT
Start: 2022-11-21

## 2023-02-18 DIAGNOSIS — I50.32 CHRONIC DIASTOLIC CHF (CONGESTIVE HEART FAILURE), NYHA CLASS 2 (HCC): ICD-10-CM

## 2023-02-20 RX ORDER — FUROSEMIDE 20 MG/1
TABLET ORAL
Qty: 90 TABLET | Refills: 3 | OUTPATIENT
Start: 2023-02-20

## 2023-06-25 ENCOUNTER — HOSPITAL ENCOUNTER (INPATIENT)
Age: 58
LOS: 1 days | Discharge: HOME | DRG: 720 | End: 2023-06-26
Attending: INTERNAL MEDICINE | Admitting: INTERNAL MEDICINE
Payer: MEDICAID

## 2023-06-25 DIAGNOSIS — A41.9 SEPSIS SECONDARY TO UTI (HCC): Primary | ICD-10-CM

## 2023-06-25 DIAGNOSIS — N39.0 SEPSIS SECONDARY TO UTI (HCC): Primary | ICD-10-CM

## 2023-06-25 PROCEDURE — 94761 N-INVAS EAR/PLS OXIMETRY MLT: CPT

## 2023-06-25 PROCEDURE — 6360000002 HC RX W HCPCS: Performed by: INTERNAL MEDICINE

## 2023-06-25 PROCEDURE — 2580000003 HC RX 258: Performed by: INTERNAL MEDICINE

## 2023-06-25 PROCEDURE — 6370000000 HC RX 637 (ALT 250 FOR IP): Performed by: INTERNAL MEDICINE

## 2023-06-25 PROCEDURE — 94640 AIRWAY INHALATION TREATMENT: CPT

## 2023-06-25 PROCEDURE — 94664 DEMO&/EVAL PT USE INHALER: CPT

## 2023-06-25 PROCEDURE — 1200000000 HC SEMI PRIVATE

## 2023-06-25 RX ORDER — ATORVASTATIN CALCIUM 40 MG/1
40 TABLET, FILM COATED ORAL NIGHTLY
Status: DISCONTINUED | OUTPATIENT
Start: 2023-06-25 | End: 2023-06-26 | Stop reason: HOSPADM

## 2023-06-25 RX ORDER — ASPIRIN 81 MG/1
81 TABLET ORAL DAILY
Status: DISCONTINUED | OUTPATIENT
Start: 2023-06-25 | End: 2023-06-26 | Stop reason: HOSPADM

## 2023-06-25 RX ORDER — SODIUM CHLORIDE 0.9 % (FLUSH) 0.9 %
10 SYRINGE (ML) INJECTION PRN
Status: DISCONTINUED | OUTPATIENT
Start: 2023-06-25 | End: 2023-06-26 | Stop reason: HOSPADM

## 2023-06-25 RX ORDER — ONDANSETRON 2 MG/ML
4 INJECTION INTRAMUSCULAR; INTRAVENOUS EVERY 6 HOURS PRN
Status: DISCONTINUED | OUTPATIENT
Start: 2023-06-25 | End: 2023-06-26 | Stop reason: HOSPADM

## 2023-06-25 RX ORDER — ACETAMINOPHEN 650 MG/1
650 SUPPOSITORY RECTAL EVERY 6 HOURS PRN
Status: DISCONTINUED | OUTPATIENT
Start: 2023-06-25 | End: 2023-06-26 | Stop reason: HOSPADM

## 2023-06-25 RX ORDER — ONDANSETRON 4 MG/1
4 TABLET, ORALLY DISINTEGRATING ORAL EVERY 8 HOURS PRN
Status: DISCONTINUED | OUTPATIENT
Start: 2023-06-25 | End: 2023-06-26 | Stop reason: HOSPADM

## 2023-06-25 RX ORDER — PREGABALIN 75 MG/1
150 CAPSULE ORAL 3 TIMES DAILY
Status: DISCONTINUED | OUTPATIENT
Start: 2023-06-25 | End: 2023-06-26 | Stop reason: HOSPADM

## 2023-06-25 RX ORDER — SODIUM CHLORIDE 9 MG/ML
INJECTION, SOLUTION INTRAVENOUS CONTINUOUS
Status: DISCONTINUED | OUTPATIENT
Start: 2023-06-25 | End: 2023-06-26

## 2023-06-25 RX ORDER — SODIUM CHLORIDE 0.9 % (FLUSH) 0.9 %
5-40 SYRINGE (ML) INJECTION EVERY 12 HOURS SCHEDULED
Status: DISCONTINUED | OUTPATIENT
Start: 2023-06-25 | End: 2023-06-26 | Stop reason: HOSPADM

## 2023-06-25 RX ORDER — ENOXAPARIN SODIUM 100 MG/ML
40 INJECTION SUBCUTANEOUS DAILY
Status: DISCONTINUED | OUTPATIENT
Start: 2023-06-25 | End: 2023-06-26 | Stop reason: HOSPADM

## 2023-06-25 RX ORDER — SODIUM CHLORIDE 9 MG/ML
INJECTION, SOLUTION INTRAVENOUS PRN
Status: DISCONTINUED | OUTPATIENT
Start: 2023-06-25 | End: 2023-06-26 | Stop reason: HOSPADM

## 2023-06-25 RX ORDER — POLYETHYLENE GLYCOL 3350 17 G/17G
17 POWDER, FOR SOLUTION ORAL DAILY PRN
Status: DISCONTINUED | OUTPATIENT
Start: 2023-06-25 | End: 2023-06-26 | Stop reason: HOSPADM

## 2023-06-25 RX ORDER — ACETAMINOPHEN 325 MG/1
650 TABLET ORAL EVERY 6 HOURS PRN
Status: DISCONTINUED | OUTPATIENT
Start: 2023-06-25 | End: 2023-06-26 | Stop reason: HOSPADM

## 2023-06-25 RX ADMIN — SODIUM CHLORIDE: 9 INJECTION, SOLUTION INTRAVENOUS at 05:18

## 2023-06-25 RX ADMIN — PIPERACILLIN AND TAZOBACTAM 3375 MG: 3; .375 INJECTION, POWDER, LYOPHILIZED, FOR SOLUTION INTRAVENOUS at 20:28

## 2023-06-25 RX ADMIN — MOMETASONE FUROATE AND FORMOTEROL FUMARATE DIHYDRATE 2 PUFF: 100; 5 AEROSOL RESPIRATORY (INHALATION) at 20:08

## 2023-06-25 RX ADMIN — PREGABALIN 150 MG: 75 CAPSULE ORAL at 08:06

## 2023-06-25 RX ADMIN — ASPIRIN 81 MG: 81 TABLET, COATED ORAL at 08:06

## 2023-06-25 RX ADMIN — ENOXAPARIN SODIUM 40 MG: 100 INJECTION SUBCUTANEOUS at 08:06

## 2023-06-25 RX ADMIN — SODIUM CHLORIDE, PRESERVATIVE FREE 10 ML: 5 INJECTION INTRAVENOUS at 05:19

## 2023-06-25 RX ADMIN — SODIUM CHLORIDE: 9 INJECTION, SOLUTION INTRAVENOUS at 21:17

## 2023-06-25 RX ADMIN — PIPERACILLIN AND TAZOBACTAM 3375 MG: 3; .375 INJECTION, POWDER, LYOPHILIZED, FOR SOLUTION INTRAVENOUS at 05:19

## 2023-06-25 RX ADMIN — PREGABALIN 150 MG: 75 CAPSULE ORAL at 14:42

## 2023-06-25 RX ADMIN — ATORVASTATIN CALCIUM 40 MG: 40 TABLET, FILM COATED ORAL at 20:24

## 2023-06-25 RX ADMIN — PREGABALIN 150 MG: 75 CAPSULE ORAL at 20:24

## 2023-06-25 RX ADMIN — PIPERACILLIN AND TAZOBACTAM 3375 MG: 3; .375 INJECTION, POWDER, LYOPHILIZED, FOR SOLUTION INTRAVENOUS at 12:35

## 2023-06-25 RX ADMIN — MOMETASONE FUROATE AND FORMOTEROL FUMARATE DIHYDRATE 2 PUFF: 100; 5 AEROSOL RESPIRATORY (INHALATION) at 08:02

## 2023-06-25 ASSESSMENT — LIFESTYLE VARIABLES: HOW OFTEN DO YOU HAVE A DRINK CONTAINING ALCOHOL: MONTHLY OR LESS

## 2023-06-25 ASSESSMENT — PAIN DESCRIPTION - DESCRIPTORS: DESCRIPTORS: ACHING

## 2023-06-25 ASSESSMENT — PAIN DESCRIPTION - LOCATION: LOCATION: NECK

## 2023-06-25 ASSESSMENT — PAIN DESCRIPTION - PAIN TYPE: TYPE: CHRONIC PAIN

## 2023-06-25 ASSESSMENT — PAIN - FUNCTIONAL ASSESSMENT: PAIN_FUNCTIONAL_ASSESSMENT: PREVENTS OR INTERFERES SOME ACTIVE ACTIVITIES AND ADLS

## 2023-06-25 ASSESSMENT — PAIN DESCRIPTION - FREQUENCY: FREQUENCY: CONTINUOUS

## 2023-06-25 ASSESSMENT — PAIN SCALES - GENERAL
PAINLEVEL_OUTOF10: 8
PAINLEVEL_OUTOF10: 8

## 2023-06-25 ASSESSMENT — PAIN DESCRIPTION - ONSET: ONSET: OTHER (COMMENT)

## 2023-06-26 VITALS
HEIGHT: 67 IN | HEART RATE: 97 BPM | RESPIRATION RATE: 16 BRPM | SYSTOLIC BLOOD PRESSURE: 147 MMHG | TEMPERATURE: 99.2 F | WEIGHT: 204 LBS | BODY MASS INDEX: 32.02 KG/M2 | OXYGEN SATURATION: 96 % | DIASTOLIC BLOOD PRESSURE: 84 MMHG

## 2023-06-26 PROBLEM — D73.5 SPLENIC INFARCT: Status: ACTIVE | Noted: 2023-06-26

## 2023-06-26 LAB
ALBUMIN SERPL-MCNC: 2.8 G/DL (ref 3.5–5.2)
ALBUMIN/GLOB SERPL: 0.8 {RATIO} (ref 1–2.5)
ALP SERPL-CCNC: 63 U/L (ref 40–129)
ALT SERPL-CCNC: 18 U/L (ref 5–41)
ANION GAP SERPL CALCULATED.3IONS-SCNC: 9 MMOL/L (ref 9–17)
AST SERPL-CCNC: 23 U/L
BASOPHILS # BLD: 0.04 K/UL (ref 0–0.2)
BASOPHILS NFR BLD: 0 % (ref 0–2)
BILIRUB SERPL-MCNC: 0.4 MG/DL (ref 0.3–1.2)
BUN SERPL-MCNC: 9 MG/DL (ref 8–23)
BUN/CREAT SERPL: 13 (ref 9–20)
CALCIUM SERPL-MCNC: 7.7 MG/DL (ref 8.6–10.4)
CHLORIDE SERPL-SCNC: 104 MMOL/L (ref 98–107)
CO2 SERPL-SCNC: 24 MMOL/L (ref 20–31)
CREAT SERPL-MCNC: 0.69 MG/DL (ref 0.7–1.2)
EOSINOPHIL # BLD: 0.11 K/UL (ref 0–0.44)
EOSINOPHILS RELATIVE PERCENT: 1 % (ref 1–4)
ERYTHROCYTE [DISTWIDTH] IN BLOOD BY AUTOMATED COUNT: 18.8 % (ref 11.8–14.4)
GFR SERPL CREATININE-BSD FRML MDRD: >60 ML/MIN/1.73M2
GLUCOSE SERPL-MCNC: 105 MG/DL (ref 70–99)
HCT VFR BLD AUTO: 24.9 % (ref 40.7–50.3)
HGB BLD-MCNC: 7.7 G/DL (ref 13–17)
IMM GRANULOCYTES # BLD AUTO: 0.05 K/UL (ref 0–0.3)
IMM GRANULOCYTES NFR BLD: 1 %
LYMPHOCYTES # BLD: 15 % (ref 24–43)
LYMPHOCYTES NFR BLD: 1.36 K/UL (ref 1.1–3.7)
MAGNESIUM SERPL-MCNC: 2 MG/DL (ref 1.6–2.6)
MCH RBC QN AUTO: 26.1 PG (ref 25.2–33.5)
MCHC RBC AUTO-ENTMCNC: 30.9 G/DL (ref 28.4–34.8)
MCV RBC AUTO: 84.4 FL (ref 82.6–102.9)
MONOCYTES NFR BLD: 1.13 K/UL (ref 0.1–1.2)
MONOCYTES NFR BLD: 12 % (ref 3–12)
NEUTROPHILS NFR BLD: 71 % (ref 36–65)
NEUTS SEG NFR BLD: 6.69 K/UL (ref 1.5–8.1)
NRBC BLD-RTO: 0 PER 100 WBC
PLATELET # BLD AUTO: 196 K/UL (ref 138–453)
PMV BLD AUTO: 10.4 FL (ref 8.1–13.5)
POTASSIUM SERPL-SCNC: 3.2 MMOL/L (ref 3.7–5.3)
POTASSIUM SERPL-SCNC: 3.6 MMOL/L (ref 3.7–5.3)
PROT SERPL-MCNC: 6.3 G/DL (ref 6.4–8.3)
RBC # BLD AUTO: 2.95 M/UL (ref 4.21–5.77)
SODIUM SERPL-SCNC: 137 MMOL/L (ref 135–144)
WBC OTHER # BLD: 9.4 K/UL (ref 3.5–11.3)

## 2023-06-26 PROCEDURE — 6360000002 HC RX W HCPCS: Performed by: INTERNAL MEDICINE

## 2023-06-26 PROCEDURE — 80053 COMPREHEN METABOLIC PANEL: CPT

## 2023-06-26 PROCEDURE — 94640 AIRWAY INHALATION TREATMENT: CPT

## 2023-06-26 PROCEDURE — 85027 COMPLETE CBC AUTOMATED: CPT

## 2023-06-26 PROCEDURE — 2580000003 HC RX 258: Performed by: INTERNAL MEDICINE

## 2023-06-26 PROCEDURE — 84132 ASSAY OF SERUM POTASSIUM: CPT

## 2023-06-26 PROCEDURE — 36415 COLL VENOUS BLD VENIPUNCTURE: CPT

## 2023-06-26 PROCEDURE — 6370000000 HC RX 637 (ALT 250 FOR IP): Performed by: INTERNAL MEDICINE

## 2023-06-26 PROCEDURE — 83735 ASSAY OF MAGNESIUM: CPT

## 2023-06-26 PROCEDURE — 6370000000 HC RX 637 (ALT 250 FOR IP): Performed by: NURSE PRACTITIONER

## 2023-06-26 RX ORDER — CEPHALEXIN 500 MG/1
500 CAPSULE ORAL 3 TIMES DAILY
Qty: 30 CAPSULE | Refills: 0 | Status: SHIPPED | OUTPATIENT
Start: 2023-06-26 | End: 2023-07-06

## 2023-06-26 RX ORDER — POTASSIUM CHLORIDE 20 MEQ/1
40 TABLET, EXTENDED RELEASE ORAL ONCE
Status: COMPLETED | OUTPATIENT
Start: 2023-06-26 | End: 2023-06-26

## 2023-06-26 RX ADMIN — PREGABALIN 150 MG: 75 CAPSULE ORAL at 13:51

## 2023-06-26 RX ADMIN — POTASSIUM CHLORIDE 40 MEQ: 1500 TABLET, EXTENDED RELEASE ORAL at 09:58

## 2023-06-26 RX ADMIN — POTASSIUM CHLORIDE 40 MEQ: 1500 TABLET, EXTENDED RELEASE ORAL at 15:18

## 2023-06-26 RX ADMIN — PIPERACILLIN AND TAZOBACTAM 3375 MG: 3; .375 INJECTION, POWDER, LYOPHILIZED, FOR SOLUTION INTRAVENOUS at 15:10

## 2023-06-26 RX ADMIN — MOMETASONE FUROATE AND FORMOTEROL FUMARATE DIHYDRATE 2 PUFF: 100; 5 AEROSOL RESPIRATORY (INHALATION) at 09:29

## 2023-06-26 RX ADMIN — ASPIRIN 81 MG: 81 TABLET, COATED ORAL at 09:58

## 2023-06-26 RX ADMIN — PREGABALIN 150 MG: 75 CAPSULE ORAL at 09:58

## 2023-06-26 RX ADMIN — ENOXAPARIN SODIUM 40 MG: 100 INJECTION SUBCUTANEOUS at 09:57

## 2023-06-26 RX ADMIN — SODIUM CHLORIDE: 9 INJECTION, SOLUTION INTRAVENOUS at 05:28

## 2023-06-26 RX ADMIN — SODIUM CHLORIDE, PRESERVATIVE FREE 10 ML: 5 INJECTION INTRAVENOUS at 09:58

## 2023-06-26 RX ADMIN — PIPERACILLIN AND TAZOBACTAM 3375 MG: 3; .375 INJECTION, POWDER, LYOPHILIZED, FOR SOLUTION INTRAVENOUS at 04:50

## 2023-06-26 RX ADMIN — POTASSIUM CHLORIDE 40 MEQ: 1500 TABLET, EXTENDED RELEASE ORAL at 07:15

## 2023-06-27 ENCOUNTER — TELEPHONE (OUTPATIENT)
Dept: CARDIOLOGY | Age: 58
End: 2023-06-27

## 2023-06-29 ENCOUNTER — TELEPHONE (OUTPATIENT)
Dept: CARDIOLOGY | Age: 58
End: 2023-06-29

## 2023-06-29 ENCOUNTER — HOSPITAL ENCOUNTER (OUTPATIENT)
Age: 58
Discharge: HOME | End: 2023-06-29
Payer: MEDICAID

## 2023-06-29 ENCOUNTER — HOSPITAL ENCOUNTER (OUTPATIENT)
Dept: NON INVASIVE DIAGNOSTICS | Age: 58
Discharge: HOME | End: 2023-06-29
Payer: MEDICAID

## 2023-06-29 DIAGNOSIS — I50.32 CHRONIC DIASTOLIC CHF (CONGESTIVE HEART FAILURE), NYHA CLASS 2 (HCC): ICD-10-CM

## 2023-06-29 DIAGNOSIS — Z95.1 S/P CABG X 2: ICD-10-CM

## 2023-06-29 DIAGNOSIS — I25.10 CAD, MULTIPLE VESSEL: ICD-10-CM

## 2023-06-29 DIAGNOSIS — I50.32 CHRONIC DIASTOLIC CHF (CONGESTIVE HEART FAILURE), NYHA CLASS 2 (HCC): Primary | ICD-10-CM

## 2023-06-29 LAB
LV EF: 55 %
LVEF MODALITY: NORMAL

## 2023-06-29 PROCEDURE — 36415 COLL VENOUS BLD VENIPUNCTURE: CPT

## 2023-06-29 PROCEDURE — 93306 TTE W/DOPPLER COMPLETE: CPT

## 2023-06-29 PROCEDURE — 87040 BLOOD CULTURE FOR BACTERIA: CPT

## 2023-06-30 ENCOUNTER — TELEPHONE (OUTPATIENT)
Dept: CARDIOLOGY | Age: 58
End: 2023-06-30

## 2023-07-02 LAB
MICROORGANISM SPEC CULT: NORMAL
MICROORGANISM SPEC CULT: NORMAL
SERVICE CMNT-IMP: NORMAL
SERVICE CMNT-IMP: NORMAL
SPECIMEN DESCRIPTION: NORMAL
SPECIMEN DESCRIPTION: NORMAL

## 2023-07-06 ENCOUNTER — TELEPHONE (OUTPATIENT)
Dept: CARDIOLOGY | Age: 58
End: 2023-07-06

## 2023-07-06 NOTE — TELEPHONE ENCOUNTER
----- Message from Wendie Ann MD sent at 7/6/2023 12:38 AM EDT -----  Let Patient know good news. Blood cultures were normal. I am still concerned that he may have an infected valve as the source of his Spleen and kidney lesions are unclear. How is he feeling? Thanks.

## 2023-10-16 RX ORDER — EZETIMIBE 10 MG/1
TABLET ORAL
Qty: 90 TABLET | Refills: 3 | Status: SHIPPED | OUTPATIENT
Start: 2023-10-16

## 2023-11-13 RX ORDER — ASPIRIN 81 MG/1
TABLET, COATED ORAL
Qty: 90 TABLET | Refills: 3 | Status: SHIPPED | OUTPATIENT
Start: 2023-11-13

## (undated) DEVICE — TOWEL,OR,DSP,ST,BLUE,DLX,XR,4/PK,20PK/CS: Brand: MEDLINE

## (undated) DEVICE — PACK PROCEDURE SURG OPN HRT ADD ON

## (undated) DEVICE — GEL US 20GM NONIRRITATING OVERWRAPPED FILE PCH TRNSMIT

## (undated) DEVICE — SUTURE PROL SZ 3-0 L36IN NONABSORBABLE BLU L17MM RB-1 1/2 8558H

## (undated) DEVICE — INTENDED FOR TISSUE SEPARATION, AND OTHER PROCEDURES THAT REQUIRE A SHARP SURGICAL BLADE TO PUNCTURE OR CUT.: Brand: BARD-PARKER ® CARBON RIB-BACK BLADES

## (undated) DEVICE — Device

## (undated) DEVICE — Z DISCONTINUED APPLICATOR SURG PREP 0.35OZ 2% CHG 70% ISO ALC W/ HI LT

## (undated) DEVICE — PUNCH AORT DIA4MM LNG HNDL

## (undated) DEVICE — SUTURE PERMA-HAND SZ 3-0 L30IN NONABSORBABLE BLK L17MM RB-1 K872H

## (undated) DEVICE — DRAPE SLUSH DISC W44XL66IN ST FOR RND BSIN HUSH SLUSH SYS

## (undated) DEVICE — LUER-LOK 360°: Brand: CONNECTA, LUER-LOK

## (undated) DEVICE — CATHETER IV 14GA L2IN POLYUR STR ORNG HUB SFTY RADPQ DISP

## (undated) DEVICE — SUTURE PERMAHAND SZ 4-0 L30IN NONABSORBABLE BLK L17MM RB-1 K871H

## (undated) DEVICE — BLADE ES L6IN ELASTOMERIC COAT EXT DURABLE BEND UPTO 90DEG

## (undated) DEVICE — GAUZE,SPONGE,FLUFF,6"X6.75",STRL,5/TRAY: Brand: MEDLINE

## (undated) DEVICE — TUBE CHST L20IN OD32FR SIL TAPR CONN TIP STR SFT RADPQ

## (undated) DEVICE — GOWN,AURORA,NONREINFORCED,LARGE: Brand: MEDLINE

## (undated) DEVICE — 1840 FOAM BLOCK NEEDLE COUNTER: Brand: DEVON

## (undated) DEVICE — 3M™ STERI-STRIP™ COMPOUND BENZOIN TINCTURE 40 BAGS/CARTON 4 CARTONS/CASE C1544: Brand: 3M™ STERI-STRIP™

## (undated) DEVICE — Z DISCONTINUED LONG TERM BACKORDER GRAFT VASC SZR SM 5-24 MM MP: Type: IMPLANTABLE DEVICE | Site: HEART | Status: NON-FUNCTIONAL

## (undated) DEVICE — SUTURE VCRL + SZ 4-0 L18IN ABSRB UD L19MM PS-2 3/8 CIR PRIM VCP496H

## (undated) DEVICE — TRAY CATH 16FR COMPLT CARE URIN M TEMP SENS STATLOK STBL

## (undated) DEVICE — COR-KNOT® QUICK LOAD® 6-POUCH: Brand: COR-KNOT® QUICK LOAD®

## (undated) DEVICE — GLOVE SURG SZ 65 L12IN FNGR THK87MIL WHT LTX FREE

## (undated) DEVICE — SUTURE PROL SZ 8-0 L18IN NONABSORBABLE BLU L6.5MM BV130-5 8730H

## (undated) DEVICE — SUTURE PROL SZ 7-0 L24IN NONABSORBABLE BLU L8MM BV175-6 3/8 8735H

## (undated) DEVICE — SUTURE PROL SZ 8-0 L18IN NONABSORBABLE BLU L8MM BV175-6 3/8 8740H

## (undated) DEVICE — PACK PROCEDURE SURG OPN HRT

## (undated) DEVICE — CANNULA PERF ART 4 MM CORONARY RT ANGLE W/ BALLOON LF

## (undated) DEVICE — INSUFFLATION TUBING SET WITH FILTER, FUNNEL CONNECTOR AND LUER LOCK: Brand: JOSNOE MEDICAL INC

## (undated) DEVICE — CANNULA PERF L2IN BLNT TIP 2MM VES CLR RADPQ BODY FEM LUER

## (undated) DEVICE — COR-KNOT® QUICK LOAD® SINGLES: Brand: COR-KNOT® QUICK LOAD®

## (undated) DEVICE — TEMP PACING WIRE: Brand: MYO/WIRE

## (undated) DEVICE — SUTURE VCRL + SZ 3-0 L27IN ABSRB WHT CT-1 1/2 CIR VCP258H

## (undated) DEVICE — COR-KNOT MINI® COMBO KITBASE PACKAGE TYPE - KITEACH STERILE PACKAGE KIT CONTAINS (2) SINGLE PATIENT USE COR-KNOT MINI® DEVICES AND (12) COR-KNOT® QUICK LOADS®.: Brand: COR-KNOT MINI®

## (undated) DEVICE — FOGARTY - HYDRAGRIP SURGICAL - CLAMP INSERTS: Brand: FOGARTY SOFTJAW

## (undated) DEVICE — SUTURE PERMAHAND SZ 4-0 L18IN NONABSORBABLE BLK SILK BRAID A183H

## (undated) DEVICE — BLADE OPHTH D5MM 15DEG GRN W/ RND KNURLED HNDL MICRO-SHARP

## (undated) DEVICE — BAG ENDOSCP TRNSPRT CLR RECLOSABLE 24INX20IN

## (undated) DEVICE — SIZER GRFT DIA28-38MM SURG MEAS

## (undated) DEVICE — WAX SURG 2.5GM HEMSTAT BNE BEESWAX PARAFFIN ISO PALMITATE

## (undated) DEVICE — SPONGE LAP W18XL18IN WHT COT 4 PLY FLD STRUNG RADPQ DISP ST

## (undated) DEVICE — BLADE OPHTH ORNG GRINDLESS SMALLER ALTERNATIVE TO NO15 GEN

## (undated) DEVICE — AGENT HEMSTAT W2XL14IN OXIDIZED REGENERATED CELOS ABSRB FOR

## (undated) DEVICE — SUTURE PERMAHAND SZ 2-0 L18IN NONABSORBABLE BLK L26MM SH C012D

## (undated) DEVICE — Z INACTIVE USE 2540311 LEAD PACE L475MM CHN A OR V MYOCARDIAL STEROID ELUT SIL

## (undated) DEVICE — SUTURE PROL SZ 5-0 L30IN NONABSORBABLE BLU L13MM RB-2 1/2 8710H

## (undated) DEVICE — PRESSURE MONITORING LINES 4FT. (122CM) M/F: Brand: PRESSURE MONITORING LINES

## (undated) DEVICE — COVER,LIGHT HANDLE,FLX,2/PK: Brand: MEDLINE INDUSTRIES, INC.

## (undated) DEVICE — SUTURE SZ 7 L18IN NONABSORBABLE SIL CCS L48MM 1/2 CIR STRNM M655G

## (undated) DEVICE — Z DISCONTINUED USE 2717539 NO SUB IDED SUTURE ETHBND 2-0 L30IN NONABSORBABLE GRN WHT V-5 L17MM 1/2

## (undated) DEVICE — SUTURE TICRON SZ 2-0 L30IN NONABSORBABLE BLU CV-331 DBL 8886308751

## (undated) DEVICE — SUTURE PROL SZ 3-0 L36IN NONABSORBABLE BLU L26MM SH 1/2 CIR 8522H

## (undated) DEVICE — PLATELET CONCENTRATION PACK PROC 14-20 ML SMARTPREP 2

## (undated) DEVICE — CHLORAPREP 26ML ORANGE

## (undated) DEVICE — Z DISCONTINUED USE 2275676 GLOVE SURG SZ 65 L12IN FNGR THK87MIL DK GRN LTX FREE ISOLEX

## (undated) DEVICE — CLIP SM RED INTERN HMOCLP TITAN LIGATING

## (undated) DEVICE — Z DISCONTINUED NO SUB IDED DRAIN SURG 2 COLL PT TB FOR ATS BG OASIS

## (undated) DEVICE — SUTURE NONABSORBABLE MONOFILAMENT 4-0 RB-1 36 IN BLU PROLENE 8557H

## (undated) DEVICE — TUBING, SUCTION, 9/32" X 20', STRAIGHT: Brand: MEDLINE INDUSTRIES, INC.

## (undated) DEVICE — CATHETER THOR L21IN DIA32FR R ANG HYDRAGLIDE SFT RADPQ STRP

## (undated) DEVICE — SUTURE PROL SZ 6-0 L18IN NONABSORBABLE BLU RB-2 L13MM 1/2 8714H

## (undated) DEVICE — WIRE TEMP PACE SZ 0 L24IN LIGHT/DARK BLU S STL

## (undated) DEVICE — GLOVE SURG SZ 7 L12IN FNGR THK87MIL WHT LTX FREE

## (undated) DEVICE — SUTURE VCRL + 1 L27IN ABSRB UD CT-1 L36MM 1/2 CIR TAPR PNT VCP261H

## (undated) DEVICE — SCANLAN® VASCU-STATT® II PLUS S-USE BULLDOG CLAMP W/FIRM PRESS GENTLE-JAW™- MINI STRAIGHT (GREEN), CLAMPING PRESSURE 20-25 G (1/STERILE PKG): Brand: SCANLAN® VASCU-STATT® II PLUS S-USE BULLDOG CLAMP W/FIRM PRESS GENTLE-JAW™

## (undated) DEVICE — GLOVE SURG SZ 75 L12IN FNGR THK87MIL WHT LTX FREE

## (undated) DEVICE — HEADREST NEURO DONUT 7 IN

## (undated) DEVICE — STRIP,CLOSURE,WOUND,MEDI-STRIP,1/2X4: Brand: MEDLINE

## (undated) DEVICE — PROTECTOR ULN NRV PUR FOAM HK LOOP STRP ANATOMICALLY